# Patient Record
Sex: FEMALE | Race: WHITE | NOT HISPANIC OR LATINO | Employment: OTHER | URBAN - METROPOLITAN AREA
[De-identification: names, ages, dates, MRNs, and addresses within clinical notes are randomized per-mention and may not be internally consistent; named-entity substitution may affect disease eponyms.]

---

## 2017-01-01 ENCOUNTER — HOSPITAL ENCOUNTER (INPATIENT)
Facility: MEDICAL CENTER | Age: 82
LOS: 2 days | DRG: 061 | End: 2017-08-26
Attending: EMERGENCY MEDICINE | Admitting: INTERNAL MEDICINE
Payer: MEDICARE

## 2017-01-01 ENCOUNTER — APPOINTMENT (OUTPATIENT)
Dept: RADIOLOGY | Facility: MEDICAL CENTER | Age: 82
DRG: 061 | End: 2017-01-01
Attending: PSYCHIATRY & NEUROLOGY
Payer: MEDICARE

## 2017-01-01 ENCOUNTER — APPOINTMENT (OUTPATIENT)
Dept: RADIOLOGY | Facility: MEDICAL CENTER | Age: 82
DRG: 061 | End: 2017-01-01
Attending: EMERGENCY MEDICINE
Payer: MEDICARE

## 2017-01-01 ENCOUNTER — APPOINTMENT (OUTPATIENT)
Dept: RADIOLOGY | Facility: MEDICAL CENTER | Age: 82
DRG: 061 | End: 2017-01-01
Attending: INTERNAL MEDICINE
Payer: MEDICARE

## 2017-01-01 ENCOUNTER — APPOINTMENT (OUTPATIENT)
Dept: RADIOLOGY | Facility: MEDICAL CENTER | Age: 82
DRG: 061 | End: 2017-01-01
Attending: FAMILY MEDICINE
Payer: MEDICARE

## 2017-01-01 VITALS
TEMPERATURE: 98.9 F | HEIGHT: 66 IN | OXYGEN SATURATION: 90 % | SYSTOLIC BLOOD PRESSURE: 135 MMHG | DIASTOLIC BLOOD PRESSURE: 85 MMHG | BODY MASS INDEX: 29.8 KG/M2 | RESPIRATION RATE: 27 BRPM | HEART RATE: 109 BPM | WEIGHT: 185.41 LBS

## 2017-01-01 DIAGNOSIS — I63.20 CEREBROVASCULAR ACCIDENT (CVA) DUE TO OCCLUSION OF PRECEREBRAL ARTERY (HCC): ICD-10-CM

## 2017-01-01 LAB
25(OH)D3 SERPL-MCNC: 21 NG/ML (ref 30–100)
ABO GROUP BLD: NORMAL
ABO GROUP BLD: NORMAL
ALBUMIN SERPL BCP-MCNC: 3.2 G/DL (ref 3.2–4.9)
ALBUMIN SERPL BCP-MCNC: 3.4 G/DL (ref 3.2–4.9)
ALBUMIN SERPL BCP-MCNC: 3.7 G/DL (ref 3.2–4.9)
ALBUMIN/GLOB SERPL: 1.1 G/DL
ALBUMIN/GLOB SERPL: 1.1 G/DL
ALP SERPL-CCNC: 58 U/L (ref 30–99)
ALP SERPL-CCNC: 60 U/L (ref 30–99)
ALT SERPL-CCNC: 6 U/L (ref 2–50)
ALT SERPL-CCNC: 9 U/L (ref 2–50)
ANION GAP SERPL CALC-SCNC: 7 MMOL/L (ref 0–11.9)
ANION GAP SERPL CALC-SCNC: 8 MMOL/L (ref 0–11.9)
ANION GAP SERPL CALC-SCNC: 9 MMOL/L (ref 0–11.9)
APPEARANCE UR: ABNORMAL
APTT PPP: 28.4 SEC (ref 24.7–36)
AST SERPL-CCNC: 12 U/L (ref 12–45)
AST SERPL-CCNC: 15 U/L (ref 12–45)
BACTERIA #/AREA URNS HPF: ABNORMAL /HPF
BASOPHILS # BLD AUTO: 0.5 % (ref 0–1.8)
BASOPHILS # BLD AUTO: 0.6 % (ref 0–1.8)
BASOPHILS # BLD AUTO: 0.6 % (ref 0–1.8)
BASOPHILS # BLD: 0.04 K/UL (ref 0–0.12)
BASOPHILS # BLD: 0.04 K/UL (ref 0–0.12)
BASOPHILS # BLD: 0.05 K/UL (ref 0–0.12)
BILIRUB SERPL-MCNC: 0.5 MG/DL (ref 0.1–1.5)
BILIRUB SERPL-MCNC: 0.7 MG/DL (ref 0.1–1.5)
BILIRUB UR QL STRIP.AUTO: NEGATIVE
BLD GP AB SCN SERPL QL: NORMAL
BUN SERPL-MCNC: 23 MG/DL (ref 8–22)
BUN SERPL-MCNC: 31 MG/DL (ref 8–22)
BUN SERPL-MCNC: 41 MG/DL (ref 8–22)
CALCIUM SERPL-MCNC: 10.1 MG/DL (ref 8.5–10.5)
CALCIUM SERPL-MCNC: 6.6 MG/DL (ref 8.5–10.5)
CALCIUM SERPL-MCNC: 8.9 MG/DL (ref 8.5–10.5)
CHLORIDE SERPL-SCNC: 103 MMOL/L (ref 96–112)
CHLORIDE SERPL-SCNC: 109 MMOL/L (ref 96–112)
CHLORIDE SERPL-SCNC: 117 MMOL/L (ref 96–112)
CHOLEST SERPL-MCNC: 147 MG/DL (ref 100–199)
CO2 SERPL-SCNC: 17 MMOL/L (ref 20–33)
CO2 SERPL-SCNC: 23 MMOL/L (ref 20–33)
CO2 SERPL-SCNC: 26 MMOL/L (ref 20–33)
COLOR UR: YELLOW
CREAT SERPL-MCNC: 0.69 MG/DL (ref 0.5–1.4)
CREAT SERPL-MCNC: 0.88 MG/DL (ref 0.5–1.4)
CREAT SERPL-MCNC: 1.2 MG/DL (ref 0.5–1.4)
EOSINOPHIL # BLD AUTO: 0.13 K/UL (ref 0–0.51)
EOSINOPHIL # BLD AUTO: 0.2 K/UL (ref 0–0.51)
EOSINOPHIL # BLD AUTO: 0.24 K/UL (ref 0–0.51)
EOSINOPHIL NFR BLD: 1.8 % (ref 0–6.9)
EOSINOPHIL NFR BLD: 2.4 % (ref 0–6.9)
EOSINOPHIL NFR BLD: 2.7 % (ref 0–6.9)
EPI CELLS #/AREA URNS HPF: NEGATIVE /HPF
ERYTHROCYTE [DISTWIDTH] IN BLOOD BY AUTOMATED COUNT: 44.1 FL (ref 35.9–50)
ERYTHROCYTE [DISTWIDTH] IN BLOOD BY AUTOMATED COUNT: 44.3 FL (ref 35.9–50)
ERYTHROCYTE [DISTWIDTH] IN BLOOD BY AUTOMATED COUNT: 45.1 FL (ref 35.9–50)
EST. AVERAGE GLUCOSE BLD GHB EST-MCNC: 174 MG/DL
GFR SERPL CREATININE-BSD FRML MDRD: 42 ML/MIN/1.73 M 2
GFR SERPL CREATININE-BSD FRML MDRD: 60 ML/MIN/1.73 M 2
GFR SERPL CREATININE-BSD FRML MDRD: >60 ML/MIN/1.73 M 2
GLOBULIN SER CALC-MCNC: 2.9 G/DL (ref 1.9–3.5)
GLOBULIN SER CALC-MCNC: 3.5 G/DL (ref 1.9–3.5)
GLUCOSE BLD-MCNC: 135 MG/DL (ref 65–99)
GLUCOSE BLD-MCNC: 140 MG/DL (ref 65–99)
GLUCOSE BLD-MCNC: 147 MG/DL (ref 65–99)
GLUCOSE BLD-MCNC: 158 MG/DL (ref 65–99)
GLUCOSE BLD-MCNC: 158 MG/DL (ref 65–99)
GLUCOSE SERPL-MCNC: 131 MG/DL (ref 65–99)
GLUCOSE SERPL-MCNC: 155 MG/DL (ref 65–99)
GLUCOSE SERPL-MCNC: 167 MG/DL (ref 65–99)
GLUCOSE UR STRIP.AUTO-MCNC: NEGATIVE MG/DL
GRAM STN SPEC: NORMAL
HBA1C MFR BLD: 7.7 % (ref 0–5.6)
HCT VFR BLD AUTO: 30.8 % (ref 37–47)
HCT VFR BLD AUTO: 40.6 % (ref 37–47)
HCT VFR BLD AUTO: 42.9 % (ref 37–47)
HDLC SERPL-MCNC: 39 MG/DL
HGB BLD-MCNC: 13 G/DL (ref 12–16)
HGB BLD-MCNC: 13.9 G/DL (ref 12–16)
HGB BLD-MCNC: 9.8 G/DL (ref 12–16)
IMM GRANULOCYTES # BLD AUTO: 0.04 K/UL (ref 0–0.11)
IMM GRANULOCYTES # BLD AUTO: 0.05 K/UL (ref 0–0.11)
IMM GRANULOCYTES # BLD AUTO: 0.06 K/UL (ref 0–0.11)
IMM GRANULOCYTES NFR BLD AUTO: 0.5 % (ref 0–0.9)
IMM GRANULOCYTES NFR BLD AUTO: 0.6 % (ref 0–0.9)
IMM GRANULOCYTES NFR BLD AUTO: 0.8 % (ref 0–0.9)
INR PPP: 1.06 (ref 0.87–1.13)
KETONES UR STRIP.AUTO-MCNC: ABNORMAL MG/DL
LDLC SERPL CALC-MCNC: 79 MG/DL
LEUKOCYTE ESTERASE UR QL STRIP.AUTO: ABNORMAL
LV EJECT FRACT  99904: 75
LV EJECT FRACT MOD 2C 99903: 84.57
LV EJECT FRACT MOD 4C 99902: 87.52
LV EJECT FRACT MOD BP 99901: 86.61
LYMPHOCYTES # BLD AUTO: 1.22 K/UL (ref 1–4.8)
LYMPHOCYTES # BLD AUTO: 1.29 K/UL (ref 1–4.8)
LYMPHOCYTES # BLD AUTO: 2.51 K/UL (ref 1–4.8)
LYMPHOCYTES NFR BLD: 15.3 % (ref 22–41)
LYMPHOCYTES NFR BLD: 16.8 % (ref 22–41)
LYMPHOCYTES NFR BLD: 28.5 % (ref 22–41)
MAGNESIUM SERPL-MCNC: 1.5 MG/DL (ref 1.5–2.5)
MAGNESIUM SERPL-MCNC: 2.3 MG/DL (ref 1.5–2.5)
MCH RBC QN AUTO: 30.2 PG (ref 27–33)
MCHC RBC AUTO-ENTMCNC: 31.8 G/DL (ref 33.6–35)
MCHC RBC AUTO-ENTMCNC: 32.1 G/DL (ref 33.6–35)
MCHC RBC AUTO-ENTMCNC: 32.4 G/DL (ref 33.6–35)
MCV RBC AUTO: 93.3 FL (ref 81.4–97.8)
MCV RBC AUTO: 94.1 FL (ref 81.4–97.8)
MCV RBC AUTO: 95.1 FL (ref 81.4–97.8)
MICRO URNS: ABNORMAL
MONOCYTES # BLD AUTO: 0.76 K/UL (ref 0–0.85)
MONOCYTES # BLD AUTO: 0.82 K/UL (ref 0–0.85)
MONOCYTES # BLD AUTO: 0.84 K/UL (ref 0–0.85)
MONOCYTES NFR BLD AUTO: 10.5 % (ref 0–13.4)
MONOCYTES NFR BLD AUTO: 9.5 % (ref 0–13.4)
MONOCYTES NFR BLD AUTO: 9.7 % (ref 0–13.4)
NEUTROPHILS # BLD AUTO: 5.06 K/UL (ref 2–7.15)
NEUTROPHILS # BLD AUTO: 5.15 K/UL (ref 2–7.15)
NEUTROPHILS # BLD AUTO: 6.02 K/UL (ref 2–7.15)
NEUTROPHILS NFR BLD: 58.3 % (ref 44–72)
NEUTROPHILS NFR BLD: 69.5 % (ref 44–72)
NEUTROPHILS NFR BLD: 71.4 % (ref 44–72)
NITRITE UR QL STRIP.AUTO: NEGATIVE
NRBC # BLD AUTO: 0 K/UL
NRBC # BLD AUTO: 0 K/UL
NRBC # BLD AUTO: 0.04 K/UL
NRBC BLD AUTO-RTO: 0 /100 WBC
NRBC BLD AUTO-RTO: 0 /100 WBC
NRBC BLD AUTO-RTO: 0.6 /100 WBC
PH UR STRIP.AUTO: 5.5 [PH]
PHOSPHATE SERPL-MCNC: 2.7 MG/DL (ref 2.5–4.5)
PLATELET # BLD AUTO: 155 K/UL (ref 164–446)
PLATELET # BLD AUTO: 212 K/UL (ref 164–446)
PLATELET # BLD AUTO: 220 K/UL (ref 164–446)
PMV BLD AUTO: 10.3 FL (ref 9–12.9)
PMV BLD AUTO: 10.6 FL (ref 9–12.9)
PMV BLD AUTO: 11.4 FL (ref 9–12.9)
POTASSIUM SERPL-SCNC: 3.3 MMOL/L (ref 3.6–5.5)
POTASSIUM SERPL-SCNC: 4.6 MMOL/L (ref 3.6–5.5)
POTASSIUM SERPL-SCNC: 4.9 MMOL/L (ref 3.6–5.5)
PROT SERPL-MCNC: 6.1 G/DL (ref 6–8.2)
PROT SERPL-MCNC: 7.2 G/DL (ref 6–8.2)
PROT UR QL STRIP: 30 MG/DL
PROTHROMBIN TIME: 14.1 SEC (ref 12–14.6)
RBC # BLD AUTO: 3.24 M/UL (ref 4.2–5.4)
RBC # BLD AUTO: 4.27 M/UL (ref 4.2–5.4)
RBC # BLD AUTO: 4.6 M/UL (ref 4.2–5.4)
RBC # URNS HPF: >150 /HPF
RBC UR QL AUTO: ABNORMAL
RH BLD: NORMAL
SIGNIFICANT IND 70042: NORMAL
SITE SITE: NORMAL
SODIUM SERPL-SCNC: 137 MMOL/L (ref 135–145)
SODIUM SERPL-SCNC: 139 MMOL/L (ref 135–145)
SODIUM SERPL-SCNC: 143 MMOL/L (ref 135–145)
SOURCE SOURCE: NORMAL
SP GR UR STRIP.AUTO: 1.02
T4 FREE SERPL-MCNC: 1.24 NG/DL (ref 0.53–1.43)
TRIGL SERPL-MCNC: 144 MG/DL (ref 0–149)
TROPONIN I SERPL-MCNC: <0.01 NG/ML (ref 0–0.04)
TSH SERPL DL<=0.005 MIU/L-ACNC: 0.2 UIU/ML (ref 0.3–3.7)
UROBILINOGEN UR STRIP.AUTO-MCNC: 1 MG/DL
WBC # BLD AUTO: 7.3 K/UL (ref 4.8–10.8)
WBC # BLD AUTO: 8.4 K/UL (ref 4.8–10.8)
WBC # BLD AUTO: 8.8 K/UL (ref 4.8–10.8)
WBC #/AREA URNS HPF: ABNORMAL /HPF

## 2017-01-01 PROCEDURE — 93306 TTE W/DOPPLER COMPLETE: CPT | Mod: 26 | Performed by: INTERNAL MEDICINE

## 2017-01-01 PROCEDURE — G8996 SWALLOW CURRENT STATUS: HCPCS | Mod: CL

## 2017-01-01 PROCEDURE — 87070 CULTURE OTHR SPECIMN AEROBIC: CPT

## 2017-01-01 PROCEDURE — 700102 HCHG RX REV CODE 250 W/ 637 OVERRIDE(OP): Performed by: INTERNAL MEDICINE

## 2017-01-01 PROCEDURE — A6250 SKIN SEAL PROTECT MOISTURIZR: HCPCS | Performed by: INTERNAL MEDICINE

## 2017-01-01 PROCEDURE — 84443 ASSAY THYROID STIM HORMONE: CPT

## 2017-01-01 PROCEDURE — 70450 CT HEAD/BRAIN W/O DYE: CPT

## 2017-01-01 PROCEDURE — 84100 ASSAY OF PHOSPHORUS: CPT

## 2017-01-01 PROCEDURE — 83735 ASSAY OF MAGNESIUM: CPT

## 2017-01-01 PROCEDURE — 82962 GLUCOSE BLOOD TEST: CPT

## 2017-01-01 PROCEDURE — 700117 HCHG RX CONTRAST REV CODE 255: Performed by: EMERGENCY MEDICINE

## 2017-01-01 PROCEDURE — 36415 COLL VENOUS BLD VENIPUNCTURE: CPT

## 2017-01-01 PROCEDURE — 85025 COMPLETE CBC W/AUTO DIFF WBC: CPT

## 2017-01-01 PROCEDURE — 700111 HCHG RX REV CODE 636 W/ 250 OVERRIDE (IP): Performed by: INTERNAL MEDICINE

## 2017-01-01 PROCEDURE — G8997 SWALLOW GOAL STATUS: HCPCS | Mod: CJ

## 2017-01-01 PROCEDURE — 87205 SMEAR GRAM STAIN: CPT

## 2017-01-01 PROCEDURE — 700105 HCHG RX REV CODE 258: Performed by: EMERGENCY MEDICINE

## 2017-01-01 PROCEDURE — 37212 THROMBOLYTIC VENOUS THERAPY: CPT

## 2017-01-01 PROCEDURE — 700102 HCHG RX REV CODE 250 W/ 637 OVERRIDE(OP): Performed by: PSYCHIATRY & NEUROLOGY

## 2017-01-01 PROCEDURE — 700111 HCHG RX REV CODE 636 W/ 250 OVERRIDE (IP): Performed by: EMERGENCY MEDICINE

## 2017-01-01 PROCEDURE — 99291 CRITICAL CARE FIRST HOUR: CPT

## 2017-01-01 PROCEDURE — 82306 VITAMIN D 25 HYDROXY: CPT

## 2017-01-01 PROCEDURE — 94760 N-INVAS EAR/PLS OXIMETRY 1: CPT

## 2017-01-01 PROCEDURE — 71010 DX-CHEST-PORTABLE (1 VIEW): CPT

## 2017-01-01 PROCEDURE — 86901 BLOOD TYPING SEROLOGIC RH(D): CPT

## 2017-01-01 PROCEDURE — A4314 CATH W/DRAINAGE 2-WAY LATEX: HCPCS | Performed by: INTERNAL MEDICINE

## 2017-01-01 PROCEDURE — 82962 GLUCOSE BLOOD TEST: CPT | Mod: 91

## 2017-01-01 PROCEDURE — 93005 ELECTROCARDIOGRAM TRACING: CPT | Performed by: EMERGENCY MEDICINE

## 2017-01-01 PROCEDURE — 86900 BLOOD TYPING SEROLOGIC ABO: CPT

## 2017-01-01 PROCEDURE — 82040 ASSAY OF SERUM ALBUMIN: CPT

## 2017-01-01 PROCEDURE — 770022 HCHG ROOM/CARE - ICU (200)

## 2017-01-01 PROCEDURE — 80061 LIPID PANEL: CPT

## 2017-01-01 PROCEDURE — 92610 EVALUATE SWALLOWING FUNCTION: CPT

## 2017-01-01 PROCEDURE — 70498 CT ANGIOGRAPHY NECK: CPT

## 2017-01-01 PROCEDURE — 3E03317 INTRODUCTION OF OTHER THROMBOLYTIC INTO PERIPHERAL VEIN, PERCUTANEOUS APPROACH: ICD-10-PCS | Performed by: PSYCHIATRY & NEUROLOGY

## 2017-01-01 PROCEDURE — 31720 CLEARANCE OF AIRWAYS: CPT

## 2017-01-01 PROCEDURE — 83036 HEMOGLOBIN GLYCOSYLATED A1C: CPT

## 2017-01-01 PROCEDURE — 85610 PROTHROMBIN TIME: CPT

## 2017-01-01 PROCEDURE — 302255 BARRIER CREAM MOISTURE BAZA PROTECT (ZINC) 5OZ: Performed by: INTERNAL MEDICINE

## 2017-01-01 PROCEDURE — 84439 ASSAY OF FREE THYROXINE: CPT

## 2017-01-01 PROCEDURE — 700105 HCHG RX REV CODE 258: Performed by: INTERNAL MEDICINE

## 2017-01-01 PROCEDURE — 80048 BASIC METABOLIC PNL TOTAL CA: CPT

## 2017-01-01 PROCEDURE — 70496 CT ANGIOGRAPHY HEAD: CPT

## 2017-01-01 PROCEDURE — A9270 NON-COVERED ITEM OR SERVICE: HCPCS | Performed by: PSYCHIATRY & NEUROLOGY

## 2017-01-01 PROCEDURE — 86850 RBC ANTIBODY SCREEN: CPT

## 2017-01-01 PROCEDURE — 85730 THROMBOPLASTIN TIME PARTIAL: CPT

## 2017-01-01 PROCEDURE — 93306 TTE W/DOPPLER COMPLETE: CPT

## 2017-01-01 PROCEDURE — 81001 URINALYSIS AUTO W/SCOPE: CPT

## 2017-01-01 PROCEDURE — 700101 HCHG RX REV CODE 250: Performed by: INTERNAL MEDICINE

## 2017-01-01 PROCEDURE — 92526 ORAL FUNCTION THERAPY: CPT

## 2017-01-01 PROCEDURE — 80053 COMPREHEN METABOLIC PANEL: CPT

## 2017-01-01 PROCEDURE — 84484 ASSAY OF TROPONIN QUANT: CPT

## 2017-01-01 RX ORDER — POTASSIUM CHLORIDE 750 MG/1
10 TABLET, FILM COATED, EXTENDED RELEASE ORAL DAILY
COMMUNITY

## 2017-01-01 RX ORDER — SODIUM CHLORIDE 9 MG/ML
INJECTION, SOLUTION INTRAVENOUS
Status: DISCONTINUED | OUTPATIENT
Start: 2017-01-01 | End: 2017-01-01

## 2017-01-01 RX ORDER — LORAZEPAM 2 MG/ML
1 CONCENTRATE ORAL
Status: DISCONTINUED | OUTPATIENT
Start: 2017-01-01 | End: 2017-01-01 | Stop reason: HOSPADM

## 2017-01-01 RX ORDER — HYDRALAZINE HYDROCHLORIDE 20 MG/ML
20 INJECTION INTRAMUSCULAR; INTRAVENOUS EVERY 4 HOURS PRN
Status: DISCONTINUED | OUTPATIENT
Start: 2017-01-01 | End: 2017-01-01

## 2017-01-01 RX ORDER — HYDRALAZINE HYDROCHLORIDE 20 MG/ML
10 INJECTION INTRAMUSCULAR; INTRAVENOUS
Status: DISCONTINUED | OUTPATIENT
Start: 2017-01-01 | End: 2017-01-01

## 2017-01-01 RX ORDER — SODIUM CHLORIDE 9 MG/ML
INJECTION, SOLUTION INTRAVENOUS CONTINUOUS
Status: DISCONTINUED | OUTPATIENT
Start: 2017-01-01 | End: 2017-01-01

## 2017-01-01 RX ORDER — BISACODYL 10 MG
10 SUPPOSITORY, RECTAL RECTAL
Status: DISCONTINUED | OUTPATIENT
Start: 2017-01-01 | End: 2017-01-01

## 2017-01-01 RX ORDER — LABETALOL HYDROCHLORIDE 5 MG/ML
10 INJECTION, SOLUTION INTRAVENOUS
Status: DISCONTINUED | OUTPATIENT
Start: 2017-01-01 | End: 2017-01-01

## 2017-01-01 RX ORDER — SODIUM CHLORIDE 9 MG/ML
50 INJECTION, SOLUTION INTRAVENOUS ONCE
Status: COMPLETED | OUTPATIENT
Start: 2017-01-01 | End: 2017-01-01

## 2017-01-01 RX ORDER — ATROPINE SULFATE 10 MG/ML
2 SOLUTION/ DROPS OPHTHALMIC EVERY 4 HOURS PRN
Status: DISCONTINUED | OUTPATIENT
Start: 2017-01-01 | End: 2017-01-01 | Stop reason: HOSPADM

## 2017-01-01 RX ORDER — MORPHINE SULFATE 10 MG/ML
10 INJECTION, SOLUTION INTRAMUSCULAR; INTRAVENOUS
Status: DISCONTINUED | OUTPATIENT
Start: 2017-01-01 | End: 2017-01-01 | Stop reason: HOSPADM

## 2017-01-01 RX ORDER — AMOXICILLIN 250 MG
2 CAPSULE ORAL 2 TIMES DAILY
Status: DISCONTINUED | OUTPATIENT
Start: 2017-01-01 | End: 2017-01-01

## 2017-01-01 RX ORDER — LORAZEPAM 2 MG/ML
1 INJECTION INTRAMUSCULAR
Status: DISCONTINUED | OUTPATIENT
Start: 2017-01-01 | End: 2017-01-01 | Stop reason: HOSPADM

## 2017-01-01 RX ORDER — FUROSEMIDE 40 MG/1
80 TABLET ORAL DAILY
COMMUNITY

## 2017-01-01 RX ORDER — DEXTROSE MONOHYDRATE 25 G/50ML
25 INJECTION, SOLUTION INTRAVENOUS
Status: DISCONTINUED | OUTPATIENT
Start: 2017-01-01 | End: 2017-01-01

## 2017-01-01 RX ORDER — ATORVASTATIN CALCIUM 40 MG/1
40 TABLET, FILM COATED ORAL
Status: DISCONTINUED | OUTPATIENT
Start: 2017-01-01 | End: 2017-01-01

## 2017-01-01 RX ORDER — LORAZEPAM 2 MG/ML
2-4 INJECTION INTRAMUSCULAR
Status: DISCONTINUED | OUTPATIENT
Start: 2017-01-01 | End: 2017-01-01

## 2017-01-01 RX ORDER — LISINOPRIL 20 MG/1
20 TABLET ORAL
Status: DISCONTINUED | OUTPATIENT
Start: 2017-01-01 | End: 2017-01-01

## 2017-01-01 RX ORDER — MORPHINE SULFATE 100 MG/5ML
10 SOLUTION ORAL
Status: DISCONTINUED | OUTPATIENT
Start: 2017-01-01 | End: 2017-01-01 | Stop reason: HOSPADM

## 2017-01-01 RX ORDER — MAGNESIUM SULFATE HEPTAHYDRATE 40 MG/ML
2 INJECTION, SOLUTION INTRAVENOUS ONCE
Status: COMPLETED | OUTPATIENT
Start: 2017-01-01 | End: 2017-01-01

## 2017-01-01 RX ORDER — POLYVINYL ALCOHOL 14 MG/ML
2 SOLUTION/ DROPS OPHTHALMIC EVERY 6 HOURS PRN
Status: DISCONTINUED | OUTPATIENT
Start: 2017-01-01 | End: 2017-01-01 | Stop reason: HOSPADM

## 2017-01-01 RX ORDER — POTASSIUM CHLORIDE 20 MEQ/1
20 TABLET, EXTENDED RELEASE ORAL DAILY
Status: DISCONTINUED | OUTPATIENT
Start: 2017-01-01 | End: 2017-01-01

## 2017-01-01 RX ORDER — ONDANSETRON 4 MG/1
8 TABLET, ORALLY DISINTEGRATING ORAL EVERY 8 HOURS PRN
Status: DISCONTINUED | OUTPATIENT
Start: 2017-01-01 | End: 2017-01-01 | Stop reason: HOSPADM

## 2017-01-01 RX ORDER — ONDANSETRON 2 MG/ML
8 INJECTION INTRAMUSCULAR; INTRAVENOUS EVERY 8 HOURS PRN
Status: DISCONTINUED | OUTPATIENT
Start: 2017-01-01 | End: 2017-01-01 | Stop reason: HOSPADM

## 2017-01-01 RX ORDER — POLYETHYLENE GLYCOL 3350 17 G/17G
1 POWDER, FOR SOLUTION ORAL
Status: DISCONTINUED | OUTPATIENT
Start: 2017-01-01 | End: 2017-01-01

## 2017-01-01 RX ORDER — LABETALOL HYDROCHLORIDE 5 MG/ML
10 INJECTION, SOLUTION INTRAVENOUS EVERY 4 HOURS PRN
Status: DISCONTINUED | OUTPATIENT
Start: 2017-01-01 | End: 2017-01-01

## 2017-01-01 RX ORDER — SODIUM CHLORIDE AND POTASSIUM CHLORIDE 150; 900 MG/100ML; MG/100ML
INJECTION, SOLUTION INTRAVENOUS CONTINUOUS
Status: DISCONTINUED | OUTPATIENT
Start: 2017-01-01 | End: 2017-01-01

## 2017-01-01 RX ORDER — MORPHINE SULFATE 4 MG/ML
1-5 INJECTION, SOLUTION INTRAMUSCULAR; INTRAVENOUS
Status: DISCONTINUED | OUTPATIENT
Start: 2017-01-01 | End: 2017-01-01

## 2017-01-01 RX ORDER — SCOLOPAMINE TRANSDERMAL SYSTEM 1 MG/1
1 PATCH, EXTENDED RELEASE TRANSDERMAL
Status: DISCONTINUED | OUTPATIENT
Start: 2017-01-01 | End: 2017-01-01

## 2017-01-01 RX ORDER — ASPIRIN 300 MG/1
300 SUPPOSITORY RECTAL DAILY
Status: DISCONTINUED | OUTPATIENT
Start: 2017-01-01 | End: 2017-01-01

## 2017-01-01 RX ORDER — SCOLOPAMINE TRANSDERMAL SYSTEM 1 MG/1
1 PATCH, EXTENDED RELEASE TRANSDERMAL
Status: DISCONTINUED | OUTPATIENT
Start: 2017-01-01 | End: 2017-01-01 | Stop reason: HOSPADM

## 2017-01-01 RX ORDER — MORPHINE SULFATE 10 MG/ML
5 INJECTION, SOLUTION INTRAMUSCULAR; INTRAVENOUS
Status: DISCONTINUED | OUTPATIENT
Start: 2017-01-01 | End: 2017-01-01 | Stop reason: HOSPADM

## 2017-01-01 RX ADMIN — SODIUM CHLORIDE: 9 INJECTION, SOLUTION INTRAVENOUS at 01:10

## 2017-01-01 RX ADMIN — POTASSIUM CHLORIDE AND SODIUM CHLORIDE: 900; 150 INJECTION, SOLUTION INTRAVENOUS at 15:20

## 2017-01-01 RX ADMIN — MAGNESIUM SULFATE IN WATER 2 G: 40 INJECTION, SOLUTION INTRAVENOUS at 09:38

## 2017-01-01 RX ADMIN — ALTEPLASE 68 MG: KIT at 23:12

## 2017-01-01 RX ADMIN — MORPHINE SULFATE 4 MG: 4 INJECTION INTRAVENOUS at 12:10

## 2017-01-01 RX ADMIN — INSULIN LISPRO 2 UNITS: 100 INJECTION, SOLUTION INTRAVENOUS; SUBCUTANEOUS at 06:28

## 2017-01-01 RX ADMIN — INSULIN LISPRO 2 UNITS: 100 INJECTION, SOLUTION INTRAVENOUS; SUBCUTANEOUS at 06:33

## 2017-01-01 RX ADMIN — IOHEXOL 100 ML: 350 INJECTION, SOLUTION INTRAVENOUS at 22:28

## 2017-01-01 RX ADMIN — SODIUM CHLORIDE 50 ML: 9 INJECTION, SOLUTION INTRAVENOUS at 00:15

## 2017-01-01 RX ADMIN — ASPIRIN 300 MG: 300 SUPPOSITORY RECTAL at 11:06

## 2017-01-01 ASSESSMENT — ENCOUNTER SYMPTOMS
FALLS: 1
CHILLS: 0
COUGH: 0
ORTHOPNEA: 0
ABDOMINAL PAIN: 0
BLURRED VISION: 0
VOMITING: 0
FOCAL WEAKNESS: 1
FEVER: 0
NECK PAIN: 0
PALPITATIONS: 0
HEMOPTYSIS: 0
SHORTNESS OF BREATH: 0
NAUSEA: 0
HEADACHES: 0
DEPRESSION: 0
HEARTBURN: 0

## 2017-01-01 ASSESSMENT — PAIN SCALES - GENERAL
PAINLEVEL_OUTOF10: 0

## 2017-01-01 ASSESSMENT — LIFESTYLE VARIABLES
EVER_SMOKED: NEVER
ALCOHOL_USE: NO

## 2017-01-01 ASSESSMENT — PATIENT HEALTH QUESTIONNAIRE - PHQ9
1. LITTLE INTEREST OR PLEASURE IN DOING THINGS: NOT AT ALL
SUM OF ALL RESPONSES TO PHQ QUESTIONS 1-9: 0
SUM OF ALL RESPONSES TO PHQ9 QUESTIONS 1 AND 2: 0

## 2017-08-24 PROBLEM — I63.9 STROKE (HCC): Status: ACTIVE | Noted: 2017-01-01

## 2017-08-25 PROBLEM — F03.90 DEMENTIA (HCC): Chronic | Status: ACTIVE | Noted: 2017-01-01

## 2017-08-25 PROBLEM — R60.0 PEDAL EDEMA: Status: ACTIVE | Noted: 2017-01-01

## 2017-08-25 NOTE — ED NOTES
Pt to room 3 from CT. Report from [facility]ERS. Pt BIB [facility] for stroke symptoms, onset 1930 with slurred speech and left sided facial droop. Pt then took a nap x 1.5 hours, woke with left sided deficits.  at . Pt is oriented to self and place only. Denies headache. Right pupil slightly bigger than left, both reactive. Pt has left sided facial droop, / push pulls weaker on left, + drift left upper and left lower extremity. NIH score of 10. VSS.

## 2017-08-25 NOTE — H&P
DATE OF ADMISSION:  08/24/2017    CHIEF COMPLAINT:  Left-sided weakness and facial droop.    HISTORY OF PRESENT ILLNESS:  The patient is a 89-year-old fairly independent   female with past medical history significant for mild dementia, systemic   arterial hypertension, hypertrophic cardiomyopathy and thyroid disease, who   had a sudden onset of slurred speech and left facial droop at approximately   1930.  She laid down and took an hour and a half nap and when she awoke, she   had left arm and leg weakness which caused her to stumble and fall striking   her head but without loss of consciousness.  She was brought in by EMS and was   found to have an NIH score of 10.  A CT head without contrast revealed no   acute intracranial process and a CT angio of the brain revealed occlusion of   the right middle cerebral artery as well as the right internal carotid artery.    She remained within the thrombolytic window with no absolute   contraindications and in consultation with tele-neurology, Dr. Mejía she was   given TPA.  At the time of my evaluation, she is regaining slight strength in   her left upper and left lower extremity but continues to have the facial droop   and slurred speech.  She was never hypertensive despite stroke.  Per her son,   patient lives with his nephew and uses a walker at baseline, but is fairly   independent.  She was admitted back in January of 2016 for urinary tract   infection and spent 3 weeks at rehab at that time, but was driving up until   that point.  Her biggest fear however, is becoming bedbound, which she would   never want per her son.  She has not had any recent illness symptoms, chest   pain, syncope, palpitations that her son is aware of.  Patient is unable to   provide additional history at this time given her slurred speech and slight   degree of confusion.    PAST MEDICAL HISTORY:  1.  Systemic arterial hypertension.  2.  Hypertrophic cardiomyopathy with mitral  regurgitation.  3.  Hypothyroidism.  4.  Diabetes.  5.  Urinary tract infections.  6.  Hypoxemia using occasional oxygen at home.    ALLERGIES:  TO PENICILLIN.    OUTPATIENT MEDICATIONS:  Include potassium chloride, Lasix, metformin,   glipizide, Synthroid and lisinopril.    PAST SURGICAL HISTORY:  Includes abdominal hysterectomy, tonsillectomy, and   mastectomy.    SOCIAL HISTORY:  Negative for tobacco, alcohol and drugs.  She lives with her   nephew who helps care for her and is a DNR for her code status.    FAMILY HISTORY:  Noncontributory.    REVIEW OF SYSTEMS:  Please see history present illness, otherwise unable to   obtain additional given patient's current clinical condition.    PHYSICAL EXAMINATION:  VITAL SIGNS:  Temperature 36.8 degrees Celsius, heart rate of 71, respiratory   rate of 17, oxygen saturation 97% on 2 liters nasal cannula and a blood   pressure of 142/45.  GENERAL:  Well-developed, well-nourished, female, resting comfortably in no   acute distress.  HEENT:  Normocephalic, atraumatic.  Pupils are equal, round and reactive to   light without scleral icterus or conjunctival pallor.  No nystagmus noted.    She does have left facial droop, but no drooling and positive slurred speech.  NECK:  Supple.  Trachea is midline.  There is no stridor nor jugular venous   distention.  No obvious carotid bruit.  CARDIOVASCULAR:  Regular rate and rhythm with a 4/6 systolic murmur in the   left sternal border, laterally displaced PMI.  Radial pulses are 2+ and   symmetric, brisk capillary refill.  PULMONARY:  Clear to auscultation bilaterally without wheezing, rhonchi, or   rales.  Breathing comfortably and able to speak without respiratory   difficulty.  ABDOMEN:  Soft, nontender and nondistended.  Positive bowel sounds.  GENITOURINARY:  Normal external female genitalia with Flor catheter in place.  EXTREMITIES:  Trace lower extremity edema with mild chronic venous stasis   changes of the skin.  No clubbing  nor cyanosis.  NEUROLOGIC:  Patient is awake, oriented to person and place, but having   difficulty with memory of the event.  She has left facial droop, slurred   speech, a pronator drift on the left side and 4/5 strength in left upper and   3/5 strength in the left lower extremity, normal strength in the right side.    Unable to follow commands briskly on that side.  SKIN:  Warm, pink and dry without lesions or rash.    LABORATORY DATA:  CBC with a white count of 9000, hemoglobin 14, platelets of   220 with a normal differential.  Complete metabolic panel remarkable for a BUN   of 41, creatinine 1.20.  Glucose of 167.  LFTs within normal limits.  Calcium   is 10.1, troponin less than 0.01.  Coags within normal limits.    IMAGIN.  CT head without contrast showing area of low density in the right parietal   occipital lobe compatible with areas of infarct with changes of small vessel   ischemic disease and mild atrophy and atherosclerosis.  2.  CT angio of the head with and without processing shows an occlusion of the   right middle cerebral artery at distal M1, occlusion of the right internal   carotid artery extending from just distal to the bifurcation to the   intracranial internal carotid arterial segments with reconstitution of the   right circulation via [place], a right P1 segment narrowing   compatible with stenosis of partial occlusion and distal right vertebral   artery stenosis of approximately 70% luminal narrowing.  3.  Chest x-ray showing fine reticular pattern, worse at the bases consistent   with mild pulmonary edema, enlarged cardiac silhouette.  No consolidation or   effusions noted nor tubes nor lines.    EKG showing normal sinus rhythm with a rate of 88 with left axis deviation, AV   dissociations and no acute ST changes.    ASSESSMENT:  1.  Acute ischemic stroke with occlusion of the right MCA and right ICA status   post TPA and not candidate for interventional thrombectomy.  2.   Systemic arterial hypertension.  3.  Diabetes with hyperglycemia.  4.  Acute kidney injury.  5.  Mild noncardiogenic pulmonary edema, query neurogenic in nature.  6.  Mild dementia at baseline.  7.  Hypothyroidism.  8.  Do-not-resuscitate/do-not-intubate.    PLAN:  The patient is critically ill at this time post-thrombolysis and will   be admitted to the intensive care unit for the next 24 hours.  She will   undergo post-thrombolysis protocol including serial neurologic exams   monitoring for any signs of bleeding and neurologic decline.  She will be   started on antiplatelet agents post-thrombolysis as well as high dose statin.    She will undergo MRI of her brain as well as echocardiogram and will follow   up on her CT imaging of her neck vessels and if that was not done, then a   carotid duplex.  She will need aggressive therapy evaluation including speech   therapy and will keep her n.p.o. in the meantime until she has been seen by   them and cleared for diet.  Should she fail that she will likely need CORTRAK   placement for initiation of her statin and blood pressure control medications.    We will monitor her glucose closely using a sliding scale of insulin if   needed and will avoid venopuncture and procedures in the next 24 hours   post-TPA.  She will be kept on DVT prophylaxis per ICU protocols and her blood   pressure will be controlled with a goal systolic less than 185 and diastolic   less than 110.  Electrolytes will be monitored closely and replaced as   necessary.  The patient is critically ill at this time and we appreciate the   opportunity to assist in her care and will continue to follow along closely   with you.    Critical care time 32 minutes.  No time overlap.  Procedures are not included   in this time.    [id number].       ____________________________________     Jeremy Gonda, MD JG / OZZY    DD:  08/25/2017 00:19:43  DT:  08/25/2017 00:59:56    D#:  [id number]  Job#:  [id number]

## 2017-08-25 NOTE — THERAPY
Occupational Therapy Contact Note    OT order received. Pt on bedrest s/p alteplase infusion until tomorrrow AM. Will attempt when appropriate.    Huma Davenport, OTR/L

## 2017-08-25 NOTE — H&P
Internal Medicine Admitting History and Physical    Name Juhi Zhao     1923   Age/Sex 89 y.o. female   MRN [id number]   Code Status DNAR     After 5PM or if no immediate response to page, please call for cross-coverage  Attending/Team: Dr. Gonda/WILLY hill See Patient List for primary contact information  Call [phone] to page    Resident: Lydia        Chief Complaint:  Left sided weakness    HPI:  Ms. Zhao is a very pleasant 89 year old female with PMHx significant for DM type 2, hypertension, breast cancer status post surgery and radiation, hypothyroidism, and chronic venous stasis presents to the ER with complaints of left sided weakness.    At 7:30PM the patient was noted to have some facial droop and dysarthria with no other symptoms. She took a nap for one and a half hours and after waking up she was walking to the bathroom with assistance from her grandson, she fell down. Initially family thought patient was having high blood sugar and EMS were called. They arrived and assessed her to have a stroke with left sided arm and leg weakness.     She was brought in to the ER and stroke protocol was started. CT angio was done and she was found to have significant clot burden on the right side. She was administered tPA appropriately after consultation with tele-neurology by  ER physician. It was administered at 10:49 PM which is within the window period since possible onset of stroke.     Of note, code status was discussed with both patient and her son. It was made clear that patient preferred to have quality of life and wished to be independent. Her previous code status was partial DNR and after discussion with patient and family, code status was changed to DNAR.      Review of Systems   Constitutional: Negative for fever and chills.   Eyes: Negative for blurred vision.   Respiratory: Negative for cough and shortness of breath.    Cardiovascular: Positive for leg swelling. Negative for  "chest pain and palpitations.   Gastrointestinal: Negative for nausea, vomiting and abdominal pain.   Genitourinary: Negative for dysuria and urgency.   Musculoskeletal: Positive for falls. Negative for joint pain.   Skin: Negative for rash.   Neurological: Positive for focal weakness. Negative for headaches.   Psychiatric/Behavioral: Negative for depression.             Past Medical History:   Past Medical History   Diagnosis Date   • Arthritis    • Cancer (CMS-HCC)      breast lt   • Unspecified urinary incontinence    • Hypertrophic cardiomyopathy (CMS-HCC)    • MR (mitral regurgitation)    • Unspecified disorder of thyroid    • UTI (lower urinary tract infection) 1/11/2016   • Anesthesia      \"Fear of not waking up\"   • Diabetes (CMS-HCC)    • Hypertension    • Urinary bladder disorder      hx of UTI    • Dental disorder      upper dentures   • Bronchitis    • Breath shortness      uses oxygen as needed       Past Surgical History:  Past Surgical History   Procedure Laterality Date   • Abdominal hysterectomy total     • Tonsillectomy     • Node biopsy sentinel  7/1/2010     Performed by FAITH WARNER at [facility]   • Mastectomy  7/1/2010     Performed by FAITH WARNER at [facility]   • Lymph node excision  7/1/2010     Performed by FAITH WARNER at [facility]   • Ectropian repair Bilateral 8/31/2016     Procedure: ECTROPIAN REPAIR - LOWER LID W/TRANSCONJUNCTIVAL RETRACTORS REINSERTION ;  Surgeon: Gavin Zarco M.D.;  Location: [facility];  Service:    • Lid tightening Bilateral 8/31/2016     Procedure: LID TIGHTENING - LOWER LID;  Surgeon: Gavin Zarco M.D.;  Location: [facility];  Service:        Current Outpatient Medications:  Home Medications     Reviewed by Amber Heaton, PHARMD (Pharmacist) on 08/24/17 at 2210  Med List Status: Not Addressed    Medication Last Dose Status    furosemide (LASIX) 40 MG Tab  Active    glipiZIDE " "(GLUCOTROL) 5 MG Tab  Active    levothyroxine (SYNTHROID) 125 MCG Tab  Active    lisinopril (PRINIVIL, ZESTRIL) 30 MG tablet  Active    metformin (GLUCOPHAGE) 500 MG Tab  Active    potassium chloride ER (KLOR-CON 10) 10 MEQ tablet  Active                Medication Allergy/Sensitivities:  Allergies   Allergen Reactions   • Penicillins Rash         Family History:  Family History   Problem Relation Age of Onset   • Diabetes Mother    • Cancer Neg Hx        Social History:  Social History     Social History   • Marital Status:      Spouse Name: N/A   • Number of Children: N/A   • Years of Education: N/A     Occupational History   • Not on file.     Social History Main Topics   • Smoking status: Never Smoker    • Smokeless tobacco: Not on file   • Alcohol Use: No   • Drug Use: No   • Sexual Activity: Not on file     Other Topics Concern   • Not on file     Social History Narrative     Living situation: Lives with grandson  PCP : Jovani Tracy M.D.      Physical Exam     Filed Vitals:    08/24/17 2301 08/24/17 2317 08/24/17 2330 08/24/17 2346   BP:       Pulse: 90 84 83 71   Temp:       Resp: 20 21 20 17   Height:       Weight:       SpO2: 96% 97% 95% 97%     Body mass index is 29.87 kg/(m^2).  /85 mmHg  Pulse 71  Temp(Src) 36.8 °C (98.2 °F)  Resp 17  Ht 1.676 m (5' 6\")  Wt 83.915 kg (185 lb)  BMI 29.87 kg/m2  SpO2 97%  O2 therapy: Pulse Oximetry: 97 %, O2 (LPM): 2, O2 Delivery: Nasal Cannula    Physical Exam   Constitutional: She is oriented to person, place, and time and well-developed, well-nourished, and in no distress.   HENT:   Head: Normocephalic and atraumatic.   Cardiovascular: Normal rate, regular rhythm and normal heart sounds.    Pulmonary/Chest: Effort normal and breath sounds normal. She has no wheezes.   Abdominal: Soft. Bowel sounds are normal. There is no tenderness.   Neurological: She is alert and oriented to person, place, and time. She displays weakness (left sided weakness " (3/5)) and abnormal speech. A cranial nerve deficit (facial droop to right side) is present. She shows pronator drift. GCS score is 15.             Data Review       Old Records Request:   Completed  Current Records review and summary: Completed    Lab Data Review:  Recent Results (from the past 24 hour(s))   CBC WITH DIFFERENTIAL    Collection Time: 08/24/17  9:40 PM   Result Value Ref Range    WBC 8.8 4.8 - 10.8 K/uL    RBC 4.60 4.20 - 5.40 M/uL    Hemoglobin 13.9 12.0 - 16.0 g/dL    Hematocrit 42.9 37.0 - 47.0 %    MCV 93.3 81.4 - 97.8 fL    MCH 30.2 27.0 - 33.0 pg    MCHC 32.4 (L) 33.6 - 35.0 g/dL    RDW 44.1 35.9 - 50.0 fL    Platelet Count 220 164 - 446 K/uL    MPV 11.4 9.0 - 12.9 fL    Neutrophils-Polys 58.30 44.00 - 72.00 %    Lymphocytes 28.50 22.00 - 41.00 %    Monocytes 9.50 0.00 - 13.40 %    Eosinophils 2.70 0.00 - 6.90 %    Basophils 0.50 0.00 - 1.80 %    Immature Granulocytes 0.50 0.00 - 0.90 %    Nucleated RBC 0.00 /100 WBC    Neutrophils (Absolute) 5.15 2.00 - 7.15 K/uL    Lymphs (Absolute) 2.51 1.00 - 4.80 K/uL    Monos (Absolute) 0.84 0.00 - 0.85 K/uL    Eos (Absolute) 0.24 0.00 - 0.51 K/uL    Baso (Absolute) 0.04 0.00 - 0.12 K/uL    Immature Granulocytes (abs) 0.04 0.00 - 0.11 K/uL    NRBC (Absolute) 0.00 K/uL   COMP METABOLIC PANEL    Collection Time: 08/24/17  9:40 PM   Result Value Ref Range    Sodium 137 135 - 145 mmol/L    Potassium 4.9 3.6 - 5.5 mmol/L    Chloride 103 96 - 112 mmol/L    Co2 26 20 - 33 mmol/L    Anion Gap 8.0 0.0 - 11.9    Glucose 167 (H) 65 - 99 mg/dL    Bun 41 (H) 8 - 22 mg/dL    Creatinine 1.20 0.50 - 1.40 mg/dL    Calcium 10.1 8.5 - 10.5 mg/dL    AST(SGOT) 12 12 - 45 U/L    ALT(SGPT) 9 2 - 50 U/L    Alkaline Phosphatase 60 30 - 99 U/L    Total Bilirubin 0.5 0.1 - 1.5 mg/dL    Albumin 3.7 3.2 - 4.9 g/dL    Total Protein 7.2 6.0 - 8.2 g/dL    Globulin 3.5 1.9 - 3.5 g/dL    A-G Ratio 1.1 g/dL   PROTHROMBIN TIME    Collection Time: 08/24/17  9:40 PM   Result Value Ref Range     PT 14.1 12.0 - 14.6 sec    INR 1.06 0.87 - 1.13   APTT    Collection Time: 17  9:40 PM   Result Value Ref Range    APTT 28.4 24.7 - 36.0 sec   COD (ADULT)    Collection Time: 17  9:40 PM   Result Value Ref Range    ABO Grouping Only O     Rh Grouping Only POS     Antibody Screen-Cod NEG    TROPONIN    Collection Time: 17  9:40 PM   Result Value Ref Range    Troponin I <0.01 0.00 - 0.04 ng/mL   ESTIMATED GFR    Collection Time: 17  9:40 PM   Result Value Ref Range    GFR If  51 (A) >60 mL/min/1.73 m 2    GFR If Non  42 (A) >60 mL/min/1.73 m 2   EKG (NOW)    Collection Time: 17 10:34 PM   Result Value Ref Range    Report       [facility] Emergency Dept.    Test Date:  2017  Pt Name:    HERB HERR               Department: ER  MRN:        [id number]                      Room:       [facility]  Gender:     F                            Technician: [id number]  :        [id number]                   Requested By:DEIDRA DUARTE  Order #:    [id number]                    Reading MD:    Measurements  Intervals                                Axis  Rate:       88                           P:          92  MN:                                      QRS:        -32  QRSD:       92                           T:          39  QT:         372  QTc:        450    Interpretive Statements  AV DISSOCIATION  LEFT AXIS DEVIATION  Compared to ECG 2016 12:39:22  AV dissociation now present  Sinus rhythm no longer present     ABO CONFIRMATION    Collection Time: 17 10:56 PM   Result Value Ref Range    Second ABO Typing With Cod O        Imaging/Procedures Review:    ndependant Imaging Review: Completed  CT-CTA NECK WITH & W/O-POST PROCESSING   Final Result         1.  Occlusion of the right internal carotid artery just distal to the bifurcation.   2.  90% stenosis of the right internal carotid artery at the bifurcation.   3.  High-grade stenosis of  the left proximal internal carotid artery resulting in up to 86%.   4.  Atherosclerosis and soft plaque at the origin and proximal portion of the left subclavian artery resulting in up to 70% stenosis.   5.  Distal right vertebral artery stenosis up to approximately 55%.   6.  Scattered emphysema      These findings were discussed with the patient's clinician, Jimmy Lopes, on 8/24/2017 11:06 PM.      DX-CHEST-PORTABLE (1 VIEW)   Final Result         1.  Hazy bilateral pulmonary opacities, greatest in the lung bases, appearance suggests infiltrates.   2.  Atherosclerosis      CT-CTA HEAD WITH & W/O-POST PROCESS   Final Result         1.  Occlusion of the right middle cerebral artery at the distal M1.   2.  Occlusion of the right internal carotid artery extending from just distal to the bifurcation to the intracranial internal carotid arterial segments. Reconstitution of the right circulation is seen via the [place].   3.  Right P1 segment narrowing, compatible with stenosis or partial occlusion.   4.  Distal right vertebral artery stenosis with approximately 70% luminal narrowing.      These findings were discussed with the patient's clinician, Jimmy Lopes, on 8/24/2017 11:06 PM.      CT-HEAD W/O   Final Result         1.  Area of low-density in the right parieto-occipital lobe region, compatible with areas of infarct. New since prior study   2.  Changes of small vessel ischemic disease with mild atrophy noted.   3.  Atherosclerosis.      Echocardiogram Comp W/O cont    (Results Pending)   Cartoid Duplex ([facility] Only) - Do not order if CTA - Neck done in ER    (Results Pending)   MR-BRAIN-W/O    (Results Pending)            EKG:   EKG Independant Review: Completed  QTc:450, HR: 88, Normal Sinus Rhythm, no ST/T changes     Records reviewed and summarized in current documentation             Assessment/Plan     * Stroke (CMS-[facility]) (present on admission)  Assessment & Plan  Acute  ischemic embolic stroke causing left sided focal deficits.  CT-angio reveals occlusion of right MCA artery.  CTA of neck reveals high grade stenosis of both right and left internal carotid artery which is likely the source of the embolism.  Status post tPA in ER after consultation with tele-neurology. (NIH score was 10).  Will monitor in ICU for 24 hours.  NPO for now, pending speech eval.  Aspirin may be started 24 hours after tPA. Atorvastatin 80mg daily may be started if patient is passes swallow eval otherwise coretrak tube has to be considered.  Avoid needle sticks for 24 hours unless there is loss of PIV line.  Consider vascular surgery consult in AM.  Echo pending.  PT/OT eval pending.  Target blood pressure is 185/110 or lower.    Type 2 diabetes mellitus (CMS-HCC) (present on admission)  Assessment & Plan  Recently diagnosed last year.  Will hold metformin and glypizide.  Monitor with accuchecks and use sliding scale insulin if necessary.    HTN (hypertension) (present on admission)  Assessment & Plan  Will hold home medication (Lisinopril 30mg daily) for now and they can be resumed after 24 hours.  Target blood pressure is 185/110 mmHg or lower.  Labetalol and hydralazine PRN in place.  Nicardipine drip to be started if blood pressure is not controlled with above PRN medication.    Hypothyroidism (present on admission)  Assessment & Plan  Hold levothyroxine 125mcg for now.  Can resume once patient is able to take diet.    Hypertrophic cardiomyopathy (CMS-HCC) (present on admission)  Assessment & Plan  No imaging on file.  Unclear history and etiology.  Echo pending.    Pedal edema (present on admission)  Assessment & Plan  Appears to be due to chronic venous stasis.  Was on lasix 80mg daily with KCL supplementation.  Will hold for now.        Anticipated Hospital stay:  >2 midnights        Quality Measures  EKG reviewed, Labs reviewed, Medications reviewed and Radiology images reviewed  Flor catheter: No  Flor      DVT Prophylaxis: Contraindicated - High bleeding risk  DVT prophylaxis - mechanical: SCDs  Ulcer prophylaxis: Yes

## 2017-08-25 NOTE — ASSESSMENT & PLAN NOTE
Acute ischemic embolic stroke causing left sided focal deficits.  CT-angio reveals occlusion of right MCA artery.  CTA of neck reveals high grade stenosis of both right and left internal carotid artery which is likely the source of the embolism.  Received tPA at 23 8/24/17 (NIH score was 10).  Swallow eval failed today  Place cortrak for feeding  Continue Aspirin and atorvastatin   Keep BP less than 185/110   Echo: EF 75%, no  valvular lesions  PT/OT eval   Discussed with family the vascular surgery vs meds and she is not a good candidate for surgery according to her age and dementia and co-morbidities.

## 2017-08-25 NOTE — ASSESSMENT & PLAN NOTE
FAST stage 5   She needs help for all ADLs  Could be vascular or alzheimer   PT/OT  Palliative care connsulted.

## 2017-08-25 NOTE — CONSULTS
"Reason for PC Consult: Advance Care Planning    Consulted by: Dr. Plata    Assessment:  General: 89 year old female admitted for acute ischemic embolic stroke on 8/24/17. Received TPA. PMH significant for dementia, breast CA s/p chemo and radiation, DM2, HTN, and hypothyroidism. Patient able to mumble incomprehensible words. Grandson/caregiver John Zhao at bedside.     Dyspnea: 97% on 4 LPM via nasal cannula.   Last BM:  PTA.    Pain: Rosanna reports pain \"when they turn her.\"   Depression: Unable to determine.      Spiritual:  Is Jainism or spirituality important for coping with this illness? No; rosanna declined spiritual care visit.   Has a  or spiritual provider visit been requested? No    Palliative Performance Scale: 10%    Advance Directive: None on file. Rosanna Zhao reports that the patient has one at home and he will bring a copy in tomorrow.     DPOA: None on file.    POLST: None on file.     Code Status: DNR.      Outcome:  Introduced myself and discussed role of palliative care. John moved from [place] to become his grandmother's full time caregiver. Discussed goals of care. He reports that patient failed her first swallow evaluation and they are awaiting a second. He believes the patient indicated she does not want a feeding tube per her AD. John expressed he can bring a copy in tomorrow. Patient's son Bari works for the school district and will be in later tonight. Offered to set up a meeting to discuss goals of care once second swallow evaluation is completed. John offered to reach out to Bari for availability. Provided palliative care brochure and business card and encouraged John to call with available time to have palliative care meet with him and Bari tomorrow.     Plan: John to bring AD tomorrow and reach out to Bari for availability to meet with palliative care. Will await notification from John to coordinate meeting time to discuss goals of " care.    Thank you for allowing Palliative Care to participate in this patient's care. Please feel free to call [phone] with any questions or concerns.

## 2017-08-25 NOTE — THERAPY
"Speech Language Therapy Clinical Swallow Evaluation completed.  Functional Status: Clinical swallow evaluation completed on this date.  Patient AA&O x3, not oriented to date but confused.  She inconsistently followed directives to complete the oral [facility] exam which revealed left-sided asymmetry, moderate diffuse weakness of the oral structures, reduced lingual and labial ROM and coordination.  Presentation of PO included ice chips x4, 1/2 tsps of nectars x4 and 1/4 tsp of purees x1.  The patient presented with s/sx of aspiration as seen by absent swallow trigger with >75% of all PO trials requiring oral suctioning via [place] and weak cough with ~40% of trials.  She had weak and sluggish laryngeal elevation when she did complete the swallow which was severely delayed.  At this time, the patient is not at the level for a PO diet.  Discussed case with RN and Dietitian.  Recommend the patient remain NPO today and reassess in the AM.  SLP following   Recommendations - Diet: Diet / Liquid Recommendation: NPO, Pre-Feeding Trials with SLP Only                          Strategies: To be assessed                          Medication Administration: Medication Administration :  (via IV)  Plan of Care: Will benefit from Speech Therapy 5 times per week  Post-Acute Therapy: Discharge to a transitional care facility for continued skilled therapy services.    See \"Rehab Therapy-Acute\" Patient Summary Report for complete documentation.   "

## 2017-08-25 NOTE — ASSESSMENT & PLAN NOTE
Appears to be due to chronic venous stasis with redness on skin(no infection).  Resumed lasix  Compression socks.

## 2017-08-25 NOTE — ASSESSMENT & PLAN NOTE
Recently diagnosed last year.  A1c:7.7  Home meds metformin and glypizide.  Hold home meds and continue SSI and hypoglycemic protocol.

## 2017-08-25 NOTE — ED PROVIDER NOTES
ED Provider Note    Scribed for Jimmy Lopes M.D. by Jimmy Lopes. 8/24/2017,  10:16 PM.    CHIEF COMPLAINT  Chief Complaint   Patient presents with   • Possible Stroke       HPI  Juhi Zhao is a 89 y.o. female who presents to the Emergency Department for a possible stroke, onset 7:30 PM, according to family. EMS reports that her family member who lives with her noted some slight slurring of her speech and some left-sided facial droop at 7:30. The patient then took a 1.5 hour nap. When she woke up, she was noted to be stumbling. She had a fall against a wall, striking her head, but did not lose consciousness. She is on aspirin but no other blood thinners. I met her immediately on arrival in the emergency department. She had obvious left-sided facial droop, slight slurring of her speech, and near flaccidity of her left leg and left upper extremity. She seemed to be more or less fully alert and oriented, though her exact baseline is unclear since she is not accompanied by family.     REVIEW OF SYSTEMS  See HPI for further details. All other systems are negative.     PAST MEDICAL HISTORY   has a past medical history of Arthritis; Cancer (CMS-HCC); Unspecified urinary incontinence; Hypertrophic cardiomyopathy (CMS-HCC); MR (mitral regurgitation); Unspecified disorder of thyroid; UTI (lower urinary tract infection) (1/11/2016); Anesthesia; Diabetes (CMS-[facility]); Hypertension; Urinary bladder disorder; Dental disorder; Bronchitis; and Breath shortness.    SOCIAL HISTORY  Social History     Social History Main Topics   • Smoking status: Never Smoker    • Smokeless tobacco: Not on file   • Alcohol Use: No   • Drug Use: No   • Sexual Activity: Not on file     History   Drug Use No       SURGICAL HISTORY   has past surgical history that includes abdominal hysterectomy total; tonsillectomy; node biopsy sentinel (7/1/2010); mastectomy (7/1/2010); lymph node excision (7/1/2010); ectropian repair (Bilateral,  "8/31/2016); and lid tightening (Bilateral, 8/31/2016).    CURRENT MEDICATIONS  Home Medications     Reviewed by JOO GrahamD (Pharmacist) on 08/24/17 at 2210  Med List Status: Not Addressed    Medication Last Dose Status    furosemide (LASIX) 40 MG Tab  Active    glipiZIDE (GLUCOTROL) 5 MG Tab  Active    levothyroxine (SYNTHROID) 125 MCG Tab  Active    lisinopril (PRINIVIL, ZESTRIL) 30 MG tablet  Active    metformin (GLUCOPHAGE) 500 MG Tab  Active    potassium chloride ER (KLOR-CON 10) 10 MEQ tablet  Active                ALLERGIES  Allergies   Allergen Reactions   • Penicillins Rash       PHYSICAL EXAM  VITAL SIGNS: /85 mmHg  Pulse 83  Temp(Src) 36.8 °C (98.2 °F)  Resp 20  Ht 1.676 m (5' 6\")  Wt 83.915 kg (185 lb)  BMI 29.87 kg/m2  SpO2 95%  Pulse ox interpretation: I interpret this pulse ox as normal.  Constitutional: Alert in no apparent distress.  HENT: No signs of trauma, Bilateral external ears normal, Nose normal.   Eyes: Conjunctiva normal, Non-icteric.   Neck: Normal range of motion, Supple, No stridor.   Lymphatic: No lymphadenopathy noted.   Cardiovascular: Regular rate and rhythm, no murmurs.   Thorax & Lungs: Normal breath sounds, No respiratory distress, No wheezing, No chest tenderness.   Abdomen: Bowel sounds normal, Soft, No tenderness, No masses, No pulsatile masses. No peritoneal signs.  Skin: Warm, Dry, No erythema, No rash.   Extremities: Intact distal pulses, No edema, No cyanosis.  Musculoskeletal: Good range of motion in all major joints. No or major deformities noted.   Neurologic: Alert , left-sided facial droop, near vicinity of the left arm and left leg. NIH of 10 for L sided Arm and leg and facial weakness, dysarthria, dysphasia, abnormal heel shin on one side, incorrect orientation questions.  Psychiatric: Affect normal, Judgment normal, Mood normal.     DIAGNOSTIC STUDIES / PROCEDURES    EKG  Interpreted by me    Rhythm:  Normal sinus rhythm   Rate: 88  Axis: " left  Intervals: normal  Ectopy: none  Conduction: normal  ST Segments: no acute change  T Waves: no acute change  Q Waves: none      LABS  Labs Reviewed   CBC WITH DIFFERENTIAL - Abnormal; Notable for the following:     MCHC 32.4 (*)     All other components within normal limits    Narrative:     Indicate which anticoagulants the patient is on:->UNKNOWN   COMP METABOLIC PANEL - Abnormal; Notable for the following:     Glucose 167 (*)     Bun 41 (*)     All other components within normal limits    Narrative:     Indicate which anticoagulants the patient is on:->UNKNOWN   ESTIMATED GFR - Abnormal; Notable for the following:     GFR If  51 (*)     GFR If Non  42 (*)     All other components within normal limits    Narrative:     Indicate which anticoagulants the patient is on:->UNKNOWN   PROTHROMBIN TIME    Narrative:     Indicate which anticoagulants the patient is on:->UNKNOWN   APTT    Narrative:     Indicate which anticoagulants the patient is on:->UNKNOWN   COD (ADULT)   TROPONIN    Narrative:     Indicate which anticoagulants the patient is on:->UNKNOWN   ABO CONFIRMATION   URINALYSIS,CULTURE IF INDICATED     All labs reviewed by me.    RADIOLOGY  CT-CTA HEAD WITH & W/O-POST PROCESS   Final Result         1.  Occlusion of the right middle cerebral artery at the distal M1.   2.  Occlusion of the right internal carotid artery extending from just distal to the bifurcation to the intracranial internal carotid arterial segments. Reconstitution of the right circulation is seen via the [place].   3.  Right P1 segment narrowing, compatible with stenosis or partial occlusion.   4.  Distal right vertebral artery stenosis with approximately 70% luminal narrowing.      These findings were discussed with the patient's clinician, Jimmy Lopes, on 8/24/2017 11:06 PM.      CT-HEAD W/O   Final Result         1.  Area of low-density in the right parieto-occipital lobe region,  compatible with areas of infarct. New since prior study   2.  Changes of small vessel ischemic disease with mild atrophy noted.   3.  Atherosclerosis.      DX-CHEST-PORTABLE (1 VIEW)    (Results Pending)   CT-CTA NECK WITH & W/O-POST PROCESSING    (Results Pending)     The radiologist's interpretation of all radiological studies have been reviewed by me.    COURSE & MEDICAL DECISION MAKING  Nursing notes, VS, PMSFHx reviewed in chart.     10:16 PM Patient seen and examined at bedside. Differential diagnosis includes but is not limited to hemorrhagic stroke, ischemic stroke, Ronald's paralysis, hemiplegic migraine, hyperglycemia. Ordered for immediate transport to CT for stroke protocol evaluation to evaluate.     10:30 PM while the patient was in CT, I spoke with the on-call telephone neurology physician from [facility], who feels that based on my initial assessment at triage, and the description the CT which shows a likely acute infarct that does correspond to the patient's symptoms, the patient is a reasonable TPA candidate.    10:36 PM I completed the patient's full NIH stroke scale, and she has a score of 8. I'm bringing her family back to the room to help with decision-making, either for or against TPA.    10:48 PM I spoken with the patient's son, and explained the risk benefit profile of TPA. He has a degree in biochemistry and is an informed decision maker, and feels that risk-benefit favors medication, in her case. He is aware of the potential for bleeding and death. A 2nd conversation with neurology, who has reviewed the images and laboratory tests and vital signs, and agrees that TPA is still indicated. I have called the pharmacy to initiate the TPA protocol.    10:49 PM Pharmacy will bring TPA.    11:17 PM TPA is infusing. I have spoken with the [facility] intensive care unit team, who will admit the patient for further evaluation and treatment. I received a phone call from radiology,   Mick, about 15 minutes ago, stating that the patient has a significant clot burden and that her CTA films will be reviewed with interventional radiology for consideration of clot retrieval. This has no bearing on whether or not she continues to TPA.    11:19 PM Dr. Campbell, IR reviewed her images, and feels that the patient's clot burden and underlying cerebral and cervical vascular disease maker her an poor candidate.     11:27 PM [facility]olph is at the bedside.     11:40 PM patient remains in stable but guarded condition. She is receiving her TPA infusion. She is not a clot retrieval candidate. She has been evaluated by the admitting team, and care is transferred at this time.    11:47 PM ICU nursing staff is here to transport the patient.    DISPOSITION:  Patient will be admitted to [facility] ICU team in guarded condition.      FINAL IMPRESSION  1. Ischemic stroke    Patient is critically ill.   The patient continues to have: stroke symptoms.   The vital organ system that is affected is the: brain  If untreated there is a high chance of deterioration into: worsening hemiplegia and cognitive deficits  And eventually death.   The critical care that I am providing today is: coordination of care with stroke protocol, pharmacy to deliver TPA, consults with radiology and interventional radiology regarding the patient's symptoms and imaging findings and plan of care, and discussing her course with the admitting hospitalist team  The critical that has been undertaken is medically complex.   There has been no overlap in critical care time.   Critical Care Time not including procedures: 45

## 2017-08-25 NOTE — PROGRESS NOTES
Patient to unit at 0010. Patient admitted to R101. Patient to ICU via slide board. Patient NIHSS of 10. Patient Alert and Oriented to self/place, can stated month, but note full date or year. Patient with baseline dementia per family. Patient with left facial droop, some slurred speech, baseline hard of hearing. Patient with right hearing aid. Left hearing aid left at home per family. Patient with motor drift in left upper and lower extremity. Patient NPO.  Skin assessment completed, multiple POA wounds noted, wound consult placed. Patient family at bedside, given stroke workbook, RN review workbook with family, all questions answered at this time. Patient oriented to unit. All needs met at this time. Hourly rounding in place.

## 2017-08-25 NOTE — PROGRESS NOTES
Unable to obtain blood sample for morning labs. Patient FSB, currently NPO. RN paged on-call PMA for updates. Awaiting call back.

## 2017-08-25 NOTE — PROGRESS NOTES
"[facility] ICU Progress Note      Admit Date: 8/24/2017  ICU Day: 1    Resident(s): Lul Plata  Attending: WILLY PUGH/ Dr. Correa    Date & Time:   8/25/2017   2:41 PM       Patient ID:    Name:             Juhi Zhao   YOB: 1923  Age:                 89 y.o.  female   MRN:               [id number]    HPI:  89 year old female with hx of dementia, DM II, HTN and  Hypothyroidism came with R side weakness, she was treated with TpA and then admitted to the ICU for follow up.     Consultants:  neurology  PMA:     Interval Events:      In ICU after TpA  CT head no bleefding  NPO   SLP, PT/OT  start with ASA and statin after 24 h from TpA  No surgery for carotid stenosis after discuss with family (only medication).  Palliative care consult.        Review of Systems   Constitutional: Negative for fever and chills.   HENT: Negative for hearing loss and tinnitus.    Respiratory: Negative for cough and hemoptysis.    Cardiovascular: Negative for chest pain, palpitations and orthopnea.   Gastrointestinal: Negative for heartburn and nausea.   Genitourinary: Negative for dysuria.   Musculoskeletal: Negative for neck pain.   Skin: Negative for rash.   Neurological: Negative for headaches.       PHYSICAL EXAM  Filed Vitals:    08/25/17 0540 08/25/17 0600 08/25/17 0610 08/25/17 0640   BP:       Pulse: 81  78 83   Temp:  36.8 °C (98.2 °F)     Resp: 25 25 24   Height:       Weight:       SpO2: 96%  97% 98%     Body mass index is 30.16 kg/(m^2).  /85 mmHg  Pulse 83  Temp(Src) 36.8 °C (98.2 °F)  Resp 24  Ht 1.67 m (5' 5.75\")  Wt 84.1 kg (185 lb 6.5 oz)  BMI 30.16 kg/m2  SpO2 98%  Breastfeeding? No  O2 therapy: Pulse Oximetry: 98 %, O2 (LPM): 4, O2 Delivery: Nasal Cannula    Physical Exam   Constitutional: She is well-developed, well-nourished, and in no distress. No distress.   Eyes: No scleral icterus.   Neck: No JVD present. No tracheal deviation present. No thyromegaly present. "   Cardiovascular: Normal rate.    No murmur heard.  Pulmonary/Chest: No respiratory distress. She has no wheezes. She has no rales.   Musculoskeletal: She exhibits edema.   Neurological: She is alert.   Aphasia   Weakness 1/5 on the R side     Skin: Skin is warm. No rash noted. No erythema.       Respiratory:     Respiration: (!) 24, Pulse Oximetry: 98 %    Chest Tube Drains:          Invalid input(s): [id number]    HemoDynamics:  Pulse: 83, Heart Rate (Monitored): 79 Blood Pressure : 135/85 mmHg, NIBP: 154/65 mmHg      Neuro:      Fluids:        Intake/Output Summary (Last 24 hours) at 17 1436  Last data filed at 17 0600   Gross per 24 hour   Intake    465 ml   Output      0 ml   Net    465 ml       Weight: 84.1 kg (185 lb 6.5 oz)  Body mass index is 30.16 kg/(m^2).    Recent Labs      17   0600   SODIUM  137  143   POTASSIUM  4.9  3.3*   CHLORIDE  103  117*   CO2  26  17*   BUN  41*  31*   CREATININE  1.20  0.69   MAGNESIUM   --   1.5   CALCIUM  10.1  6.6*       GI/Nutrition:  Recent Labs      17   0600   ALTSGPT  9   --    ASTSGOT  12   --    ALKPHOSPHAT  60   --    TBILIRUBIN  0.5   --    GLUCOSE  167*  131*       Heme:  Recent Labs      17   0600   RBC  4.60  3.24*   HEMOGLOBIN  13.9  9.8*   HEMATOCRIT  42.9  30.8*   PLATELETCT  220  155*   PROTHROMBTM  14.1   --    APTT  28.4   --    INR  1.06   --        Infectious Disease:  Temp  Av.8 °C (98.2 °F)  Min: 36.7 °C (98 °F)  Max: 36.8 °C (98.3 °F)  Recent Labs      17   0600   WBC  8.8  7.3   NEUTSPOLYS  58.30  69.50   LYMPHOCYTES  28.50  16.80*   MONOCYTES  9.50  10.50   EOSINOPHILS  2.70  1.80   BASOPHILS  0.50  0.60   ASTSGOT  12   --    ALTSGPT  9   --    ALKPHOSPHAT  60   --    TBILIRUBIN  0.5   --        Meds:  • potassium chloride SA  20 mEq     • aspirin EC  81 mg     • atorvastatin  40 mg     • 0.9 % NaCl with KCl 20 mEq 1,000 mL       • NS        • senna-docusate  2 Tab      And   • polyethylene glycol/lytes  1 Packet      And   • magnesium hydroxide  30 mL      And   • bisacodyl  10 mg     • insulin lispro  2-9 Units     • glucose 4 g  16 g      And   • dextrose 50%  25 mL     • labetalol  10 mg      Or   • hydrALAZINE  10 mg     • Nicardipine infusion  0-15 mg/hr Stopped (08/25/17 0000)        Procedures:  none    Imaging:  CT-HEAD W/O   Final Result      Right MCA territory infarct. Thrombosed right sylvian artery branches. No acute hemorrhage identified.   Mild right-sided mass effect with no midline shift.   Atrophy.   Central microvascular ischemic changes.      CT-CTA NECK WITH & W/O-POST PROCESSING   Final Result         1.  Occlusion of the right internal carotid artery just distal to the bifurcation.   2.  90% stenosis of the right internal carotid artery at the bifurcation.   3.  High-grade stenosis of the left proximal internal carotid artery resulting in up to 86%.   4.  Atherosclerosis and soft plaque at the origin and proximal portion of the left subclavian artery resulting in up to 70% stenosis.   5.  Distal right vertebral artery stenosis up to approximately 55%.   6.  Scattered emphysema      These findings were discussed with the patient's clinician, Jimmy Lopes, on 8/24/2017 11:06 PM.      DX-CHEST-PORTABLE (1 VIEW)   Final Result         1.  Hazy bilateral pulmonary opacities, greatest in the lung bases, appearance suggests infiltrates.   2.  Atherosclerosis      CT-CTA HEAD WITH & W/O-POST PROCESS   Final Result         1.  Occlusion of the right middle cerebral artery at the distal M1.   2.  Occlusion of the right internal carotid artery extending from just distal to the bifurcation to the intracranial internal carotid arterial segments. Reconstitution of the right circulation is seen via the [place].   3.  Right P1 segment narrowing, compatible with stenosis or partial occlusion.   4.  Distal right vertebral artery  stenosis with approximately 70% luminal narrowing.      These findings were discussed with the patient's clinician, Jimmy Lopes, on 8/24/2017 11:06 PM.      CT-HEAD W/O   Final Result         1.  Area of low-density in the right parieto-occipital lobe region, compatible with areas of infarct. New since prior study   2.  Changes of small vessel ischemic disease with mild atrophy noted.   3.  Atherosclerosis.      Echocardiogram Comp W/O cont    (Results Pending)   MR-BRAIN-W/O    (Results Pending)       Problem and Plan:      * Stroke (CMS-HCC) (present on admission)  Assessment & Plan  Acute ischemic embolic stroke causing left sided focal deficits.  CT-angio reveals occlusion of right MCA artery.  CTA of neck reveals high grade stenosis of both right and left internal carotid artery which is likely the source of the embolism.  Received tPA at 23 8/24/17 (NIH score was 10).  NPO repeat speech eval.  coretrak tube has to be considered if she does not pass swallow eval.   Aspirin and atorvastatin after 24 hours after tPA.   Keep BP less than 185/110 after TpA.   Echo: pending   PT/OT eval pending.  Discussed with family the vascular surgery vs meds and she is not a good candidate for surgery according to her age and dementia and co-morbidities.      Type 2 diabetes mellitus (CMS-HCC) (present on admission)  Assessment & Plan  Recently diagnosed last year.  A1c:7.7  Home meds metformin and glypizide.  Hold home meds and continue SSI and hypoglycemic protocol.     HTN (hypertension) (present on admission)  Assessment & Plan  Home medication Lisinopril 30mg daily.   BP: 140s/80s  Labetalol and hydralazine PRN and Nicardipine drip t to keep BP less than 185/110.  Resume home meds later.     Dementia (present on admission)  Assessment & Plan  FAST stage 5   She needs help for all ADLs  Could be vascular or alzheimer   PT/OT  Palliative care connsulted.     Hypothyroidism (present on admission)  Assessment &  Plan  Continue levothyroxine 125mcg  Check TSH.     Hypertrophic cardiomyopathy (CMS-HCC) (present on admission)  Assessment & Plan  No imaging on file.  Unclear history and etiology.  Echo pending.    Pedal edema (present on admission)  Assessment & Plan  Appears to be due to chronic venous stasis with redness on skin(no infection).  Home med:  lasix 80mg daily with KCL supplementation.  Resume home meds later.   Compression socks.         DISPO: ICU    CODE STATUS: DNR    Quality Measures:  Flor Catheter: no  DVT Prophylaxis: Tpa and then heparin   Ulcer Prophylaxis: none  Antibiotics: none  Lines: PIV

## 2017-08-25 NOTE — CARE PLAN
Problem: Emergency Care of the Stroke Patient  Goal: Establish Time of Onset  Intervention: Patient/Family verbalizes understanding of diagnosis, medications and testing  Patient diann Sumner at bedside, verbalized understanding of POC, all questions answered at this time.   Intervention: NIHSS Completed and Documented  (STROKE) NIH Stroke Scale Group   LOC:  Alert, Keenly Responsive  LOC Questions:  Answers Neither Question Correctly  LOC Commands:  Performs Both Tasks Correctly  Best Gaze:  Normal  Visual:  No visual loss.  Facial Palsy:  Partial Paralysis (Total or Near-Total Paralysis of Lower Face).  Rt Motor Arm:  No Drift. Limb Holds 90 (or 45) Degrees for Full 10 Seconds.  Lt Motor Arm:  Drift. Limb Holds 90 (or 45) Degrees, but Drifts Down Before Full 10 Seconds. Does Not Hit Bed or Other Support.  Rt Motor Leg:  No Drift. Leg Holds 30-Degree Position for Full 5 Seconds.  Lt Motor Leg:  Some Effort Against [place]. Leg Falls to Bed by 5 Seconds, but Has Some Effort Against Gravity.  Limb Ataxia:  Present in One Limb  Sensory:  Normal. No Sensory Loss  Best Language:  Mild to Mod. Aphasia. Some Obvious Loss of Fluency of Comprehension, w/o Signif. Limitation on Ideas Expressed. Reduction of Speech and/or Comprehension, However, Makes Conversation About Provided Materials Difficult or Impossible.  Dysarthria:  Mild to Moderate Dysarthria. Patient Slurs at Least Some Words and, at Worst, Can be Understood with Some Dificulty.  Extinction & Inattention:  No Abnormality  Total Score: 10        Goal: Medication as ordered  Outcome: PROGRESSING SLOWER THAN EXPECTED  Patient currently NPO strict, awaiting SLP for evaluation  Goal: Activity as appropriate  Outcome: PROGRESSING AS EXPECTED   Bedrest orders per MD.  Goal: Nutrition     Dysphagia Screening Tool  Identifying Dysphagia: Admitted with Neurological Event (Stroke, TIA, Seizure, Dementia, TBI, Spinal Cord Injury) (Awaiting SLP for fomal evaluation for s/p  tPA/[place]te[place])        Problem: Acute Care of the Stroke Patient  Goal: Optimal Outcome for the Stroke patient  Intervention: Vital Signs and Neuro Checks per order  Neuro checks and Vital sign checks in place.  Intervention: NIHSS completed and documented  NIHSS of 10 documented on admit to unit.  Intervention: Consider Echocardiogram  Order has been placed.  Intervention: Lipid panel if not completed in ED (if drawn previously, must be within 30 days to be current)  Order has been placed.  Intervention: Neuro Consult  Neuro MD consult in place.  Intervention: PT/OT/SLP needs: LIP must assess the need for Rehab Services. If none needed, LIP must document reason.  Orders in place. Patient and family updated on POC.      Problem: Knowledge Deficit  Goal: Patient/Significant other demonstrates understanding of disease process, treatment plan, medications and discharge instructions  Outcome: PROGRESSING AS EXPECTED  Patient family member at bedside, updated on POC. Patient diann Sumner given Stroke workbook. Stroke workbook at bedside.    Problem: Risk of Aspiration  Goal: Absence of aspiration  Outcome: PROGRESSING AS EXPECTED  Intervention: NPO until medically cleared  Patient strict NPO per MD order. HOB at 30 degrees or greater.

## 2017-08-25 NOTE — PROGRESS NOTES
RN spoke with Dr. Hyacinth Hidalgo for Neurology regarding patient Neuro status and the MRI.  Patient improving from tPA administration. Per MD ok to wait on MRI and not needed to be ordered as STAT at this time. New orders for CT.  Dayshilei RN updated.

## 2017-08-25 NOTE — PROGRESS NOTES
RN spoke with SHANTE Jansen regarding MRI.  Iris stated there was no available appointments last night and other ICU patients ahead of this order.  RN discussed with family and on-coming RN regarding what MRI tech stated. RN paged on-call Neurology for updates regarding MRI.  Awaiting call back.     RN updated Dr. oPp regarding Critical Ca level of 6.6. MD read back Critical Ca level of 6.6

## 2017-08-25 NOTE — ED NOTES
Pt has stronger / push pulls to left side. + drift to LEONOR and LLE. Pt denies headache. Right pupil remains slightly larger than left.

## 2017-08-25 NOTE — PROGRESS NOTES
Pulmonary Critical Care Progress Note        Date of Service:  8/25/2017    History of Present Illness: The patient is a 89-year-old fairly independent female with past medical history significant for mild dementia, systemic arterial hypertension, hypertrophic cardiomyopathy and thyroid disease, who had a sudden onset of slurred speech and left facial droop at approximately 1930 on the day of admission on 8/24.  She laid down and took an hour and a half nap and when she awoke, she had left arm and leg weakness which caused her to stumble and fall striking her head but without loss of consciousness.  She was brought in by EMS and was found to have an NIH score of 10.  A CT head without contrast revealed no acute intracranial process and a CT angio of the brain revealed occlusion of the right middle cerebral artery as well as the right internal carotid artery.  She remained within the thrombolytic window with no absolute contraindications and in consultation with tele-neurology, Dr. Mejía she was given TPA.      ROS:  Respiratory: negative, Cardiac: negative, GI: negative.  All other systems negative.    Interval Events:  24 hour interval history reviewed    - s/p alteplase at 2310   - Neuro checks ok with continued left sided facial/extremity weakness   - Failed swallow eval   - Skin breakdown to perineum as the patient is incontinent   - Going to CT head now    PFSH:  No change.    Respiratory:     Pulse Oximetry: 98 %    Exam: clear to auscultation without rales or wheezes  ImagingAvailable data reviewed         Invalid input(s): [id number]    HemoDynamics:  Pulse: 83, Heart Rate (Monitored): 79  Blood Pressure : 135/85 mmHg, NIBP: 154/65 mmHg       Exam: regular rate and rhythm  Imaging: Available data reviewed  Recent Labs      08/24/17   2140   TROPONINI  <0.01       Neuro:  GCS Total Randolph Coma Score: 14       Exam: slurred speech, very hard of hearing, moving left upper and lower extremity spontaneously,  but still weaker in comparison to the right, slight left facial droop  Imaging: Available data reviewed    Fluids:  Intake/Output       17 07 - 17 0659 (Not Admitted) 17 07 - 17 0659 17 07 - 17 0659      1424-0850 2422-7095 Total 1431-5364 8119-3358 Total 9121-8627 9192-4438 Total       Intake    P.O.  --  -- --  --  0 0  --  -- --    P.O. -- -- -- -- 0 0 -- -- --    I.V.  --  -- --  --  465 465  --  -- --    IV Volume (NS) -- -- -- -- 465 465 -- -- --    Total Intake -- -- -- -- 465 465 -- -- --       Output    Urine  --  -- --  --  -- --  --  -- --    Number of Times Incontinent of Urine -- -- -- -- 3 x 3 x -- -- --    Total Output -- -- -- -- -- -- -- -- --       Net I/O     -- -- -- -- 465 465 -- -- --        Weight: 84.1 kg (185 lb 6.5 oz)  Recent Labs      17   SODIUM  137  143   POTASSIUM  4.9  3.3*   CHLORIDE  103  117*   CO2  26  17*   BUN  41*  31*   CREATININE  1.20  0.69   MAGNESIUM   --   1.5   CALCIUM  10.1  6.6*       GI/Nutrition:  Exam: abdomen is soft and non-tender, normal active bowel sounds  Imaging: Available data reviewed  NPO  Liver Function  Recent Labs      17   06   ALTSGPT  9   --    ASTSGOT  12   --    ALKPHOSPHAT  60   --    TBILIRUBIN  0.5   --    GLUCOSE  167*  131*       Heme:  Recent Labs      17   RBC  4.60   HEMOGLOBIN  13.9   HEMATOCRIT  42.9   PLATELETCT  220   PROTHROMBTM  14.1   APTT  28.4   INR  1.06       Infectious Disease:  Temp  Av.8 °C (98.2 °F)  Min: 36.7 °C (98 °F)  Max: 36.8 °C (98.3 °F)  Micro: reviewed  Recent Labs      17   2140   WBC  8.8   NEUTSPOLYS  58.30   LYMPHOCYTES  28.50   MONOCYTES  9.50   EOSINOPHILS  2.70   BASOPHILS  0.50   ASTSGOT  12   ALTSGPT  9   ALKPHOSPHAT  60   TBILIRUBIN  0.5     Current Facility-Administered Medications   Medication Dose Frequency Provider Last Rate Last Dose   • NS infusion   Once PRN Jimmy Lopes,  M.D.       • senna-docusate (PERICOLACE or SENOKOT S) 8.6-50 MG per tablet 2 Tab  2 Tab BID Del Freeman M.D.        And   • polyethylene glycol/lytes (MIRALAX) PACKET 1 Packet  1 Packet QDAY PRN Del Freeman M.D.        And   • magnesium hydroxide (MILK OF MAGNESIA) suspension 30 mL  30 mL QDAY PRN Del Freeman M.D.        And   • bisacodyl (DULCOLAX) suppository 10 mg  10 mg QDAY PRN Del Freeman M.D.       • insulin lispro (HUMALOG) injection 2-9 Units  2-9 Units 4X/DAY ACHJONATHAN Freeman M.D.   2 Units at 08/25/17 0633   • glucose 4 g chewable tablet 16 g  16 g Q15 MIN PRN Del Freeman M.D.        And   • dextrose 50% (D50W) injection 25 mL  25 mL Q15 MIN PRN Del Freeman M.D.       • labetalol (NORMODYNE,TRANDATE) injection 10 mg  10 mg Q4HRS PRN Del Freeman M.D.        Or   • hydrALAZINE (APRESOLINE) injection 10 mg  10 mg Q2HRS PRN Del Freeman M.D.       • niCARdipine (CARDENE) 25 mg in  mL Infusion  0-15 mg/hr Continuous Del Freeman M.D.   Stopped at 08/25/17 0000   • NS infusion   Continuous Del Freeman M.D. 83 mL/hr at 08/25/17 0110       Last reviewed on 8/24/2017 10:10 PM by JOO GrahamD    Quality  Measures:  Medications reviewed, Labs reviewed and Radiology images reviewed        DVT Prophylaxis: Contraindicated - High bleeding risk  DVT prophylaxis - mechanical: SCDs  Ulcer prophylaxis: Not indicated        Problems/Plan:  Acute ischemic stroke with occlusion of the right MCA and right ICA    - s/p TPA and not candidate for interventional thrombectomy.   - ICU monitoring   - Q1 hr neuro checks   - will start heparin, asa, statin after 24 hours post alteplase  Systemic arterial hypertension.   - SBP goals 160-140   - labetalol/hydralazine as needed  Diabetes with hyperglycemia.   - ISS if needed  Acute kidney injury.   - improved with IVF  Mild noncardiogenic pulmonary edema, query neurogenic in nature.   - titrate O2 as needed   - monitor closely  Mild dementia at  baseline.  Hypothyroidism.  Do-not-resuscitate/do-not-intubate    Discussed patient condition and risk of morbidity and/or mortality with Family, RN, RT, Pharmacy, , [employer] resident and Patient.    The patient remains critically ill.  Critical care time = 35 minutes in directly providing and coordinating critical care and extensive data review.  No time overlap and excludes procedures.

## 2017-08-26 NOTE — CONSULTS
NEUROLOGY CONSULT         CHIEF COMPLAINT:  Left sided weakness.    Chief Complaint   Patient presents with   • Possible Stroke         Date of Service:  08/25/2017    REQUESTING PHYSICIAN: Angie Correa M.D.,       HISTORY OF PRESENT ILLNESS:  Juhi Zhao is a 89 y.o.female with history of DM, HTN, mild dementia who had a sudden onset of slurred speech and left sided facial droop that was witnessed at 19:30 by her family. She then took a  Nap, but when she woke up, she noted to be stumbling.  She was taken to [facility] where a stroke code was called. Her NIHSS was 10. CT of the Brain showed some hypodensities in the left pariietal lobe.  A teleneurology consult was placed, and  She received iv-TPA.   CTA of the head and neck showed a right ICA oclusion and right M1 occlusion.  Dr. Campbell, IR reviewed her images, and felt that given the clot burden and underlying cerebral and cervical vascular disease, she was a poor candidate for intervention.     ROS:  Positive review of systems are in BOLD  Constitutional: no fevers, chills, night sweats, weight loss  HEENT: no headache, blurred vision, loss of vision, double vision, congestion, sore throat, rhinorrhea, dyssphagia, edema   Chest: no SOB, cough, wheezing,  Heart: no chest pain, palpitation, orthopnea,  Abd: no abdominal pain, nausea, vomiting, diarrrhea, constipation,   Genitourinary: no hematuria, urinary frequency or urgency, bladder or bowel incontinence  Extremities: no joint pain, muscle pain, back pain, neck pain,   Hematological: no epistaxis, easy bruising, petechia  Neurological: please see history of present illness  Psychiatric: no anxiety, depression  Extremities: no swelling, joint pain,   Endocrine: no polyuria, polydipsea, heat or cold intolerance, weight gain, weight loss  Skin: no lesions, rashes.       PAST MEDICAL HISTORY:  Past Medical History   Diagnosis Date   • Arthritis    • Cancer (CMS-HCC)       "breast lt   • Unspecified urinary incontinence    • Hypertrophic cardiomyopathy (CMS-HCC)    • MR (mitral regurgitation)    • Unspecified disorder of thyroid    • UTI (lower urinary tract infection) 1/11/2016   • Anesthesia      \"Fear of not waking up\"   • Diabetes (CMS-HCC)    • Hypertension    • Urinary bladder disorder      hx of UTI    • Dental disorder      upper dentures   • Bronchitis    • Breath shortness      uses oxygen as needed         ALLERGIES:  Allergies   Allergen Reactions   • Penicillins Rash         MEDICATIONS:  No current facility-administered medications on file prior to encounter.     Current Outpatient Prescriptions on File Prior to Encounter   Medication Sig Dispense Refill   • metformin (GLUCOPHAGE) 500 MG Tab Take 500 mg by mouth 2 times a day, with meals.     • glipiZIDE (GLUCOTROL) 5 MG Tab Take 5 mg by mouth every day.     • levothyroxine (SYNTHROID) 125 MCG Tab Take 125 mcg by mouth Every morning on an empty stomach.     • lisinopril (PRINIVIL, ZESTRIL) 30 MG tablet Take 30 mg by mouth every day.         MEDICATIONS:    Current facility-administered medications:   •  potassium chloride SA (Kdur) tablet 20 mEq, 20 mEq, Oral, DAILY, Lul Plata M.D., Stopped at 08/25/17 0900  •  aspirin EC (ECOTRIN) tablet 81 mg, 81 mg, Oral, DAILY, Lul Plata M.D., Stopped at 08/25/17 1030  •  atorvastatin (LIPITOR) tablet 40 mg, 40 mg, Oral, QHS, Lul Plata M.D.  •  0.9 % NaCl with KCl 20 mEq infusion, , Intravenous, Continuous, Lul Plata M.D., Last Rate: 83 mL/hr at 08/25/17 1520  •  vitamin D (cholecalciferol) tablet 1,000 Units, 1,000 Units, Oral, DAILY, Lul Plata M.D., Stopped at 08/25/17 1645  •  NS infusion, , Intravenous, Once PRN, Jimmy Lopes M.D.  •  senna-docusate (PERICOLACE or SENOKOT S) 8.6-50 MG per tablet 2 Tab, 2 Tab, Oral, BID, Stopped at 08/25/17 0900 **AND** polyethylene glycol/lytes (MIRALAX) PACKET 1 Packet, 1 Packet, Oral, QDAY " PRN **AND** magnesium hydroxide (MILK OF MAGNESIA) suspension 30 mL, 30 mL, Oral, QDAY PRN **AND** bisacodyl (DULCOLAX) suppository 10 mg, 10 mg, Rectal, QDAY PRN, Del Freeman M.D.  •  insulin lispro (HUMALOG) injection 2-9 Units, 2-9 Units, Subcutaneous, 4X/DAY ACHS, Del Freeman M.D., Stopped at 08/25/17 1400  •  Action is required: Protocol 1073 Hypoglycemia has been implemented, , , Once **AND** Protocol 1073 Inclusion Criteria, , , CONTINUOUS **AND** Protocol 1073 NOTIFY, , , Once **AND** Protocol 1073 Initiate protocol immediately if FSBG is less than or equal to 70 mg/dL, , , CONTINUOUS **AND** glucose 4 g chewable tablet 16 g, 16 g, Oral, Q15 MIN PRN **AND** dextrose 50% (D50W) injection 25 mL, 25 mL, Intravenous, Q15 MIN PRN, Del Freeman M.D.  •  labetalol (NORMODYNE,TRANDATE) injection 10 mg, 10 mg, Intravenous, Q4HRS PRN **OR** hydrALAZINE (APRESOLINE) injection 10 mg, 10 mg, Intravenous, Q2HRS PRN, Del Freeman M.D.  •  niCARdipine (CARDENE) 25 mg in  mL Infusion, 0-15 mg/hr, Intravenous, Continuous, Del Freeman M.D., Stopped at 08/25/17 0000    FAMILY HISTORY:  Family History   Problem Relation Age of Onset   • Diabetes Mother    • Cancer Neg Hx        SOCIAL HISTORY:  Social History     Social History   • Marital Status:      Spouse Name: N/A   • Number of Children: N/A   • Years of Education: N/A     Occupational History   • Not on file.     Social History Main Topics   • Smoking status: Never Smoker    • Smokeless tobacco: Not on file   • Alcohol Use: No   • Drug Use: No   • Sexual Activity: Not on file     Other Topics Concern   • Not on file     Social History Narrative       EXAM:  Filed Vitals:    08/25/17 1710 08/25/17 1800 08/25/17 1810 08/25/17 1910   BP:       Pulse: 74  75 72   Temp:  36.8 °C (98.2 °F)     Resp: 25 25 26 23   Height:       Weight:       SpO2: 100%  93% 97%     General: pt is lying in bed, NAD  HEENT: normocephalic, atraumatic, normal moist oral mucosa  EYes:  anicteric, PERRLA, pupils midline  Neck: supple, no carotid bruits  Chest: CTA b/l no wheezing or rales  Heart: regular rate and rhythm, no murmurs  Abd: soft, non-tender, non distended  Extremtiies: no edema  Skin: no rashes or lesions  Neuro  General: pt was lethargic but arousable. She was hard of hearing, but there was no obvious aphasia.  She was slightly dysarhtric.  She was oriented to herself, age hospital, but not why she was hospitalized.    CN: visual fields are full to confrontation with no extinction to double simultaneous stimuli, EOMI she might have a slight right gaze preference, PERRLA, she had a left facial droop  Motor: She could slightly left her left arm against gravity but she cannot lift up her left foot. The right arm and right leg she could lift up against gravity  DTR: Absent throughout  Plantar reflexes: downgoing b/l   Coordination: finger to nose was intact on the right she cannot do it on the left  Sensation: Intact to task touch in upper extremities with no extinction to double simultaneous stimuli    LABORATORY TESTING  Lab Results   Component Value Date/Time    WBC 7.3 08/25/2017 06:00 AM    RBC 3.24* 08/25/2017 06:00 AM    HEMOGLOBIN 9.8* 08/25/2017 06:00 AM    HEMATOCRIT 30.8* 08/25/2017 06:00 AM    MCV 95.1 08/25/2017 06:00 AM    MCH 30.2 08/25/2017 06:00 AM    MCHC 31.8* 08/25/2017 06:00 AM    MPV 10.6 08/25/2017 06:00 AM    NEUTROPHILS-POLYS 69.50 08/25/2017 06:00 AM    LYMPHOCYTES 16.80* 08/25/2017 06:00 AM    MONOCYTES 10.50 08/25/2017 06:00 AM    EOSINOPHILS 1.80 08/25/2017 06:00 AM    BASOPHILS 0.60 08/25/2017 06:00 AM        IMAGING:  CT of the head was unremarkable showed  area of low-density in the right parieto-occipital lobe region, compatible with areas of infarct  CTA of the neck showed an right ICA occlusion just dsital from the birufcation to the intracranail carotid artery segment, and an occlusion of the right M1 segment.        TTE: .   Normal left ventricular  chamber size. Mild concentric left ventricular   hypertrophy. Hyperdynamic left ventricular systolic function. Left   ventricular ejection fraction is visually estimated to be greater than   75%. Normal regional wall motion. Diastolic function is indeterminate.  Mild mitral stenosis.  Right ventricular systolic pressure is estimated to be 50 mmHg.      IMPRESSION AND PLAN: Juhi Zhao is a 89 y.o.female with history of DM, HTN, mild dementia who was admitted with a right MCA stroke due to a right carotid artery occlusion who is status post TPA. The patient has made minimal improvement. She had a repeat CT of the brain today and then today which showed an evolving right MCA territory infarct but no no areas of hemorrhagic conversion    Recommend:  1) Continue Q1 hour checks in the ICU until 24 hours status post TPA.  After this point, she can be made Q4 hours.    2)  It  should be safe to start aspirin 325 mg daily 24 hours 24 hours after she received TPA.    3) The patient ahas not passed her swallow evaluation. I have not had a chance to speak to the family today regarding her CT findings and goals of care. If the patient is able to tolerate an MRI of the brain tomorrow we can consider it to further evaluate the size of stroke to help prognosticate her changes of recovery.  I will let her rest today as I was told she would not be able to cooperate with the MRI.     Hyacinth Hidalgo M.D.    Neurology

## 2017-08-26 NOTE — PROGRESS NOTES
Pulmonary Critical Care Progress Note        Date of Service:  8/26/2017    History of Present Illness: The patient is a 89-year-old fairly independent female with past medical history significant for mild dementia, systemic arterial hypertension, hypertrophic cardiomyopathy and thyroid disease, who had a sudden onset of slurred speech and left facial droop at approximately 1930 on the day of admission on 8/24.  She laid down and took an hour and a half nap and when she awoke, she had left arm and leg weakness which caused her to stumble and fall striking her head but without loss of consciousness.  She was brought in by EMS and was found to have an NIH score of 10.  A CT head without contrast revealed no acute intracranial process and a CT angio of the brain revealed occlusion of the right middle cerebral artery as well as the right internal carotid artery.  She remained within the thrombolytic window with no absolute contraindications and in consultation with tele-neurology, Dr. Mejía she was given TPA.      ROS:  Respiratory: negative, Cardiac: negative, GI: negative.  All other systems negative.    Interval Events:  24 hour interval history reviewed    - Worsening mental status since last night   - Very confused this morning   - Requiring more O2 since this morning   - Requiring frequent deep NT suctioning as she is not protecting airway or clearing secretions   - Flor placed this morning   - O2 at 10 lpm oxymask   - No pharmacy issues    Yesterday's Events:   - s/p alteplase at 2310   - Neuro checks ok with continued left sided facial/extremity weakness   - Failed swallow eval   - Skin breakdown to perineum as the patient is incontinent   - Going to CT head now    PFSH:  No change.    Respiratory:     Pulse Oximetry: 97 %on 10 lpm oxymask    Exam: coarse breath sounds throughout  ImagingAvailable data reviewed         Invalid input(s): [id number]    HemoDynamics:  Pulse: 86, Heart Rate (Monitored): 85   NIBP: 155/68       Exam: regular rate and rhythm  Imaging: Available data reviewed  Recent Labs      08/24/17 2140   TROPONINI  <0.01       Neuro:  GCS Total Rothsay Coma Score: 14       Exam: slurred speech, very hard of hearing, moving left upper and lower extremity spontaneously, but still weaker in comparison to the right, slight left facial droop  Imaging: Available data reviewed    Fluids:  Intake/Output       08/24/17 0700 - 08/25/17 0659 08/25/17 0700 - 08/26/17 0659 08/26/17 0700 - 08/27/17 0659      1220-4642 9868-9109 Total 1096-0481 1080-8755 Total 1628-3470 7056-0482 Total       Intake    P.O.  --  0 0  --  -- --  --  -- --    P.O. -- 0 0 -- -- -- -- -- --    I.V.  --  465 465  880  840 1720  --  -- --    Magnesium Sulfate Volume -- -- -- 50 -- 50 -- -- --    IV Volume (NS) -- 465 465  -- -- --    Total Intake -- 465 465  -- -- --       Output    Urine  --  -- --  --  0 0  --  -- --    Number of Times Incontinent of Urine -- 3 x 3 x 3 x 3 x 6 x -- -- --    Void (ml) -- -- -- -- 0 0 -- -- --    Total Output -- -- -- -- 0 0 -- -- --       Net I/O     -- 465 465  -- -- --           Recent Labs      08/24/17 2140 08/25/17   0600 08/26/17   0516   SODIUM  137  143  139   POTASSIUM  4.9  3.3*  4.6   CHLORIDE  103  117*  109   CO2  26  17*  23   BUN  41*  31*  23*   CREATININE  1.20  0.69  0.88   MAGNESIUM   --   1.5  2.3   PHOSPHORUS   --    --   2.7   CALCIUM  10.1  6.6*  8.9       GI/Nutrition:  Exam: abdomen is soft and non-tender, normal active bowel sounds  Imaging: Available data reviewed  NPO  Liver Function  Recent Labs      08/24/17 2140 08/25/17   0600  08/26/17   0516   ALTSGPT  9   --   6   ASTSGOT  12   --   15   ALKPHOSPHAT  60   --   58   TBILIRUBIN  0.5   --   0.7   GLUCOSE  167*  131*  155*       Heme:  Recent Labs      08/24/17 2140 08/25/17   0600   RBC  4.60  3.24*   HEMOGLOBIN  13.9  9.8*   HEMATOCRIT  42.9  30.8*   PLATELETCT  220  155*    PROTHROMBTM  14.1   --    APTT  28.4   --    INR  1.06   --        Infectious Disease:  Temp  Av.7 °C (98.1 °F)  Min: 36.6 °C (97.8 °F)  Max: 36.9 °C (98.5 °F)  Micro: reviewed  Recent Labs      17   2140  17   0600  17   0516   WBC  8.8  7.3   --    NEUTSPOLYS  58.30  69.50   --    LYMPHOCYTES  28.50  16.80*   --    MONOCYTES  9.50  10.50   --    EOSINOPHILS  2.70  1.80   --    BASOPHILS  0.50  0.60   --    ASTSGOT  12   --   15   ALTSGPT  9   --   6   ALKPHOSPHAT  60   --   58   TBILIRUBIN  0.5   --   0.7     Current Facility-Administered Medications   Medication Dose Frequency Provider Last Rate Last Dose   • potassium chloride SA (Kdur) tablet 20 mEq  20 mEq DAILY Lul Plata M.D.   Stopped at 17 0900   • aspirin EC (ECOTRIN) tablet 81 mg  81 mg DAILY Lul Plata M.D.   Stopped at 17 1030   • atorvastatin (LIPITOR) tablet 40 mg  40 mg QHS Lul Plata M.D.   Stopped at 17 2108   • vitamin D (cholecalciferol) tablet 1,000 Units  1,000 Units DAILY Lul Plata M.D.   Stopped at 17 1645   • NS infusion   Once PRN Jimmy Lopes M.D.       • senna-docusate (PERICOLACE or SENOKOT S) 8.6-50 MG per tablet 2 Tab  2 Tab BID Del Freeman M.D.   Stopped at 17 0900    And   • polyethylene glycol/lytes (MIRALAX) PACKET 1 Packet  1 Packet QDAY PRN Del Freeman M.D.        And   • magnesium hydroxide (MILK OF MAGNESIA) suspension 30 mL  30 mL QDAY PRN Del Freeman M.D.        And   • bisacodyl (DULCOLAX) suppository 10 mg  10 mg QDAY PRN Del Freeman M.D.       • insulin lispro (HUMALOG) injection 2-9 Units  2-9 Units 4X/DAY DEYANIRA Freeman M.D.   2 Units at 17 0628   • glucose 4 g chewable tablet 16 g  16 g Q15 MIN PRN Del Freeman M.D.        And   • dextrose 50% (D50W) injection 25 mL  25 mL Q15 MIN PRN Del Freeman M.D.       • labetalol (NORMODYNE,TRANDATE) injection 10 mg  10 mg Q4HRS PRN Del Freeman M.D.        Or   •  hydrALAZINE (APRESOLINE) injection 10 mg  10 mg Q2HRS PRN Del Freeman M.D.       • niCARdipine (CARDENE) 25 mg in  mL Infusion  0-15 mg/hr Continuous Del Freeman M.D.   Stopped at 08/25/17 0000     Last reviewed on 8/24/2017 10:10 PM by Amber Heaton, PharmD    Quality  Measures:  Radiology images reviewed, Labs reviewed and Medications reviewed  Flor catheter: No Flor      DVT Prophylaxis: Heparin  DVT prophylaxis - mechanical: SCDs  Ulcer prophylaxis: Not indicated        Problems/Plan:  Acute ischemic stroke with occlusion of the right MCA and right ICA    - s/p TPA and not candidate for interventional thrombectomy.   - worsening mental status and unable to protect airway or clear secretions   - pt wants to cont DNR/DNI status and will transition to comfort care status  Systemic arterial hypertension.   - SBP goals 160-140   - labetalol/hydralazine as needed  Diabetes with hyperglycemia.   - ISS if needed  Acute kidney injury.   - improved with IVF  Mild noncardiogenic pulmonary edema, query neurogenic in nature.   - titrate O2 as needed   - monitor closely  Mild dementia at baseline.  Hypothyroidism.  Do-not-resuscitate/do-not-intubate--> pt will transition to comfort care/hospice care    Discussed patient condition and risk of morbidity and/or mortality with Family, RN, RT, Pharmacy, , [facility] resident and Patient.    [id number]

## 2017-08-26 NOTE — PROGRESS NOTES
Patient continues to pick at IV and pull at lines and oxygen despite reorientation regarding the importance of keeping lines, equipment, and oxygen in place.  MD updated and restraint order received.  Patient and family updated.

## 2017-08-26 NOTE — CARE PLAN
Problem: Acute Care of the Stroke Patient  Goal: Optimal Outcome for the Stroke patient  Intervention: NIHSS completed and documented  Completed per protocol      Problem: Knowledge Deficit  Goal: Patient/Significant other demonstrates understanding of disease process, treatment plan, medications and discharge instructions  Intervention: Learning assessment and teaching  Education completed.  Stroke handout provided at bedside.  Patient and family educated and updated on plan of care.      Problem: Risk of Aspiration  Goal: Absence of aspiration  Intervention: Collaborate with SLP  SLP following.  SLP re-eval scheduled for the morning.  Patient and family updated.

## 2017-08-26 NOTE — THERAPY
"Speech Language Therapy dysphagia treatment completed.   Functional Status:  Pt awake and alert. Variable voicing with spont speech r/t variable upper airway secretions. Collaborated with nurs regarding request to see pt to assist in determining pt's POC r/t possible palliative care. Pt with weak and non-productive cough when cued. Pt tolerates oral stimulation with moist oral swab with oral moisturizer applied r/t dry oral cavity. Pt follows 3 simple lingual and labial oral motor directives. No responses to cues to swallow to clear secretions or in response to oral stimulation. Pt making eye contact with staff on the right side with probable left neglect noted. RT completed NT suction with approx 15 min of improved vocal quality, with less gurgliness and crackles auscultated to the upper airway, before voice became moderate to severe in wet sounding quality. SLP collaborated with frandy, MD, and pt's family member regarding NPO recommendations at this time with concerns for pt's upper airway protection.   Recommendations: NPO, determine pt/families preference for care.  Plan of Care: check pt's status.  Post-Acute Therapy: to be determined. Thanks, Eliseo    See \"Rehab Therapy-Acute\" Patient Summary Report for complete documentation.     "

## 2017-08-26 NOTE — PROGRESS NOTES
Updated Dr. Hidalgo (neurology) regarding patient's inability to stay still for MRI.  She stated that since she already had a CT scan today it's OK to cancel the MRI.  Patient's family at bedside.  Patient and family at bedside, updated on plan of care.

## 2017-08-26 NOTE — PALLIATIVE CARE
"PALLIATIVE CARE FOLLOW-UP:    Contacted by ICU RN d/t pt's clinical change - unresponsive, drop in saturation (currently 78% on NRB @ 15 lpm), elevated HR (117) and BP.  Grandson John at bedside - provided pt's living will declaration and DPOA designation of pt's sons Brennan and Bari.  Empathic support, discussed following pt's clear wishes as not any less sad but is a gift to know her wishes.  John attempting to contact his dad Brennan; Uncle Bari should arrive approx 15 minutes.      Discussed with Dr. Correa and GUERRERO Hancock.    Pt's son Bari arrived with his wife and three dtrs.  Bari well-versed in pt's EoL wishes and documentation of such.  Bari stated, \"She told me earlier that she's ready\" and \"She's been saying the same (wishes) since my dad  in .\"  Explained transitioning to comfort care which Bari agreed with -- also stating that he had spoken to his brother Brennan when pt was admitted and that the brothers agreed.    Discussed with granddtrs that they could hold their grandmother's hand and speak to her or whatever they are comfortable with, including not being present.  Family tearful.    Monitor turned off.  O2 off. ICU RN managing medications.    Plan:  Comfort Care    Thank you for allowing Palliative Care to support this pt and her family.  Contact [phone] for additional assistance, questions or concerns.  "

## 2017-08-26 NOTE — PROGRESS NOTES
Patient has been NT suctioned frequently, but is still has declining oxygen levels. Sputum is thin and sanguinous. Family is present with her advanced directive. Palliative care and physicians have been notified of changes, and family has agreed to proceed with comfort care.

## 2017-08-26 NOTE — PROGRESS NOTES
"[facility] ICU Progress Note      Admit Date: 8/24/2017  ICU Day: 2    Resident(s): Shar Montilla  Attending: WILLY PUGH/ Dr. Correa    Date & Time:   8/26/2017   8:10 AM       Patient ID:    Name:             Juhi Zhao   YOB: 1923  Age:                 89 y.o.  female   MRN:               [id number]    HPI:  89 year old female with hx of dementia, DM II, HTN and  Hypothyroidism came with R side weakness, she was treated with TpA and then admitted to the ICU for follow up.        Consultants:  Neurology  PMA-Dr Correa    Interval Events:  became tachypnea overnight, CXR ordered, sputum cultures, urine cultures sent,   Condition worsening and palliative will be called  Failed swallow eval  No fevers  No Leucocytosis  No acute change in mental status  S/p  TpA  CT head no bleefding-8/25  To feed via cortrak today  No surgery for carotid stenosis after discussion  with family (only medication).  Palliative care on board       ROS  Non verbal    PHYSICAL EXAM  Vitals:    08/26/17 0300 08/26/17 0400 08/26/17 0500 08/26/17 0600   BP:       Pulse: 86 77 88 86   Resp: (!) 27 (!) 25 (!) 31 (!) 26   Temp:  36.8 °C (98.2 °F)  36.6 °C (97.9 °F)   SpO2: 93% 94% 93% 97%   Weight:       Height:         Body mass index is 30.16 kg/m².  /85   Pulse 86   Temp 36.6 °C (97.9 °F)   Resp (!) 26   Ht 1.67 m (5' 5.75\")   Wt 84.1 kg (185 lb 6.5 oz)   SpO2 97%   Breastfeeding? No   BMI 30.16 kg/m²   O2 therapy: Pulse Oximetry: 97 %, O2 (LPM): 3, FIO2%: 2, O2 Delivery: Silicone Nasal Cannula    Physical Exam  General:Elderly female,  not in distress,   HEENT:no jaundice or scleral icterus, no pallor, no JVD  Respiratory:lungs clear to auscultation anteriorly and laterally    Cardiac:RRR,  S1/S2 present, no M/R/G  GI: abdomen non distended, soft, non tender, no organomegaly, bowel sounds normoactive  Neuro: alert but not oriented, non verbal(aphasic)  Pupils reactive to light  Msk/Extremities: radial, " dorsalis pedis pulses 2+b/l,     Respiratory:     Respiration: (!) 26, Pulse Oximetry: 97 %    Chest Tube Drains:          Invalid input(s): [id number]    HemoDynamics:  Pulse: 86, Heart Rate (Monitored): 85 NIBP: 155/68      Neuro:      Fluids:        Intake/Output Summary (Last 24 hours) at 17 0810  Last data filed at 17 0600   Gross per 24 hour   Intake             1554 ml   Output                0 ml   Net             1554 ml          Body mass index is 30.16 kg/m².    Recent Labs      17   05   SODIUM  137  143  139   POTASSIUM  4.9  3.3*  4.6   CHLORIDE  103  117*  109   CO2  26  17*  23   BUN  41*  31*  23*   CREATININE  1.20  0.69  0.88   MAGNESIUM   --   1.5  2.3   PHOSPHORUS   --    --   2.7   CALCIUM  10.1  6.6*  8.9       GI/Nutrition:  Recent Labs      17   0516   ALTSGPT  9   --   6   ASTSGOT  12   --   15   ALKPHOSPHAT  60   --   58   TBILIRUBIN  0.5   --   0.7   GLUCOSE  167*  131*  155*       Heme:  Recent Labs      17   0516   RBC  4.60  3.24*  4.27   HEMOGLOBIN  13.9  9.8*  13.0   HEMATOCRIT  42.9  30.8*  40.6   PLATELETCT  220  155*  212   PROTHROMBTM  14.1   --    --    APTT  28.4   --    --    INR  1.06   --    --        Infectious Disease:  Temp  Av.8 °C (98.2 °F)  Min: 36.6 °C (97.9 °F)  Max: 36.9 °C (98.5 °F)  Recent Labs      17   0516   WBC  8.8  7.3  8.4   NEUTSPOLYS  58.30  69.50  71.40   LYMPHOCYTES  28.50  16.80*  15.30*   MONOCYTES  9.50  10.50  9.70   EOSINOPHILS  2.70  1.80  2.40   BASOPHILS  0.50  0.60  0.60   ASTSGOT  12   --   15   ALTSGPT  9   --   6   ALKPHOSPHAT  60   --   58   TBILIRUBIN  0.5   --   0.7       Meds:  • potassium chloride SA  20 mEq     • aspirin EC  81 mg     • atorvastatin  40 mg     • vitamin D  1,000 Units     • NS       • senna-docusate  2 Tab      And   • polyethylene  glycol/lytes  1 Packet      And   • magnesium hydroxide  30 mL      And   • bisacodyl  10 mg     • insulin lispro  2-9 Units     • glucose 4 g  16 g      And   • dextrose 50%  25 mL     • labetalol  10 mg      Or   • hydrALAZINE  10 mg     • Nicardipine infusion  0-15 mg/hr Stopped (08/25/17 0000)        Procedures:  none    Imaging:  Echocardiogram Comp W/O cont   Final Result      CT-HEAD W/O   Final Result      Right MCA territory infarct. Thrombosed right sylvian artery branches. No acute hemorrhage identified.   Mild right-sided mass effect with no midline shift.   Atrophy.   Central microvascular ischemic changes.      CT-CTA NECK WITH & W/O-POST PROCESSING   Final Result         1.  Occlusion of the right internal carotid artery just distal to the bifurcation.   2.  90% stenosis of the right internal carotid artery at the bifurcation.   3.  High-grade stenosis of the left proximal internal carotid artery resulting in up to 86%.   4.  Atherosclerosis and soft plaque at the origin and proximal portion of the left subclavian artery resulting in up to 70% stenosis.   5.  Distal right vertebral artery stenosis up to approximately 55%.   6.  Scattered emphysema      These findings were discussed with the patient's clinician, Jimmy Lopes, on 8/24/2017 11:06 PM.      DX-CHEST-PORTABLE (1 VIEW)   Final Result         1.  Hazy bilateral pulmonary opacities, greatest in the lung bases, appearance suggests infiltrates.   2.  Atherosclerosis      CT-CTA HEAD WITH & W/O-POST PROCESS   Final Result         1.  Occlusion of the right middle cerebral artery at the distal M1.   2.  Occlusion of the right internal carotid artery extending from just distal to the bifurcation to the intracranial internal carotid arterial segments. Reconstitution of the right circulation is seen via the [place].   3.  Right P1 segment narrowing, compatible with stenosis or partial occlusion.   4.  Distal right vertebral artery  stenosis with approximately 70% luminal narrowing.      These findings were discussed with the patient's clinician, Jimmy Lopes, on 8/24/2017 11:06 PM.      CT-HEAD W/O   Final Result         1.  Area of low-density in the right parieto-occipital lobe region, compatible with areas of infarct. New since prior study   2.  Changes of small vessel ischemic disease with mild atrophy noted.   3.  Atherosclerosis.          Problem and Plan:      * Stroke (CMS-HCC)- (present on admission)   Assessment & Plan    Acute ischemic embolic stroke causing left sided focal deficits.  CT-angio reveals occlusion of right MCA artery.  CTA of neck reveals high grade stenosis of both right and left internal carotid artery which is likely the source of the embolism.  Received tPA at 23 8/24/17 (NIH score was 10).  Swallow eval failed today  Place cortrak for feeding  Continue Aspirin and atorvastatin   Keep BP less than 185/110   Echo: EF 75%, no  valvular lesions  PT/OT eval   Discussed with family the vascular surgery vs meds and she is not a good candidate for surgery according to her age and dementia and co-morbidities.          Dementia- (present on admission)   Assessment & Plan    FAST stage 5   She needs help for all ADLs  Could be vascular or alzheimer   PT/OT  Palliative care connsulted.         HTN (hypertension)- (present on admission)   Assessment & Plan    Home medication Lisinopril 30mg daily.   Resumed lisniopril 20mg   Monitor BP         Type 2 diabetes mellitus (CMS-HCC)- (present on admission)   Assessment & Plan    Recently diagnosed last year.  A1c:7.7  Home meds metformin and glypizide.  Hold home meds and continue SSI and hypoglycemic protocol.         Pedal edema- (present on admission)   Assessment & Plan    Appears to be due to chronic venous stasis with redness on skin(no infection).  Resumed lasix  Compression socks.         Hypertrophic cardiomyopathy (CMS-HCC)- (present on admission)   Assessment &  Plan    No imaging on file.  Unclear history and etiology.  Echo -EF 75%  Started lisinopril 20mg        Hypothyroidism- (present on admission)   Assessment & Plan    Continue levothyroxine 125mcg  TSH 0.2            DISPO: ICU    CODE STATUS: DNR    Quality Measures:  Flor Catheter: no  DVT Prophylaxis: Tpa and then heparin   Ulcer Prophylaxis: none  Antibiotics: none  Lines: PIV

## 2017-08-26 NOTE — PROGRESS NOTES
Late entry:    0400 patient noted to be unable to manage her secretions as well.  Multiple unsuccessful attempts at oral suctioning.  Audible crackles noted.  Dr. Freeman paged and updated on situation.  Orders received for stat chest x-ray, sputum culture, and to discontinue IV fluids.  Patient NT suctioned.  Moderate amount of yellow, tan sputum noted.  Sputum sample obtained and sent to lab for culture.  Orders entered in EPIC.

## 2017-08-26 NOTE — CARE PLAN
Problem: Safety  Goal: Free from accidental injury  Reorient pt frequently as needed..  Keep bed low with siderails up.  Activate bed alarm.  Instruct pt to call for assistance.  Have call light within reach    Problem: Knowledge Deficit  Goal: Patient/Significant other demonstrates understanding of disease process, treatment plan, medications and discharge instructions  Explain procedures and nursing care to pt and family.  Answer questions.    Problem: Skin Integrity  Goal: Skin Integrity is maintained or improved  Intervention: Implement Pressure Ulcer Prevention Protocol  Keep skin clean, dry and moisturized.  Turn and position q 2hr.  Monitor nutritional status.

## 2017-08-28 LAB
BACTERIA SPEC RESP CULT: NORMAL
GRAM STN SPEC: NORMAL
SIGNIFICANT IND 70042: NORMAL
SITE SITE: NORMAL
SOURCE SOURCE: NORMAL

## 2017-09-11 NOTE — DISCHARGE SUMMARY
CHIEF COMPLAINT ON ADMISSION  Chief Complaint   Patient presents with   • Possible Stroke       CODE STATUS  Prior    HPI & HOSPITAL COURSE  Ms. Zhao is a  89 year old female with PMHx significant for DM type 2, hypertension, breast cancer status post surgery and radiation, hypothyroidism, and chronic venous stasis presents to the ER with complaints of left sided weakness.     At 7:30PM the patient was noted to have some facial droop and dysarthria with no other symptoms. She took a nap for one and a half hours and after waking up she was walking to the bathroom with assistance from her grandson, she fell down. Initially family thought patient was having high blood sugar and EMS were called. They arrived and assessed her to have a stroke with left sided arm and leg weakness.      She was brought in to the ER and stroke protocol was started. CT angio was done and she was found to have significant clot burden on the right side. She was administered tPA appropriately after consultation with tele-neurology by  ER physician. It was administered at 10:49 PM which is within the window period since possible onset of stroke.      Of note, code status was discussed with both patient and her son. It was made clear that patient preferred to have quality of life and wished to be independent. Her previous code status was partial DNR and after discussion with patient and family, code status was changed to DNAR.    On day day 2 of admission patient's condition began to deteriorate.    She finally passed away on 8/26.        SPECIFIC OUTPATIENT FOLLOW-UP  none    DISCHARGE PROBLEM LIST  Principal Problem:    Stroke (CMS-HCC) POA: Yes  Active Problems:    Type 2 diabetes mellitus (CMS-HCC) POA: Yes    HTN (hypertension) POA: Yes    Dementia (Chronic) POA: Yes    Hypothyroidism POA: Yes    Hypertrophic cardiomyopathy (CMS-HCC) POA: Yes    Pedal edema POA: Yes  Resolved Problems:    * No resolved hospital problems. *      FOLLOW  UP  No future appointments.  Jovani Tracy M.D.  [address] [facility] [address]  [address]  [facility] [address]  [phone]            MEDICATIONS ON DISCHARGE   Juhi Zhao   Home Medication Instructions CAMRON:[id number]    Printed on:09/11/17 0842   Medication Information                      furosemide (LASIX) 40 MG Tab  Take 80 mg by mouth every day.             glipiZIDE (GLUCOTROL) 5 MG Tab  Take 5 mg by mouth every day.             levothyroxine (SYNTHROID) 125 MCG Tab  Take 125 mcg by mouth Every morning on an empty stomach.             lisinopril (PRINIVIL, ZESTRIL) 30 MG tablet  Take 30 mg by mouth every day.             metformin (GLUCOPHAGE) 500 MG Tab  Take 500 mg by mouth 2 times a day, with meals.             potassium chloride ER (KLOR-CON 10) 10 MEQ tablet  Take 10 mEq by mouth every day.                 DIET  No orders of the defined types were placed in this encounter.      ACTIVITY  none    CONSULTATIONS  none    PROCEDURES  none    LABORATORY  Lab Results   Component Value Date/Time    SODIUM 139 08/26/2017 05:16 AM    POTASSIUM 4.6 08/26/2017 05:16 AM    CHLORIDE 109 08/26/2017 05:16 AM    CO2 23 08/26/2017 05:16 AM    GLUCOSE 155 (H) 08/26/2017 05:16 AM    BUN 23 (H) 08/26/2017 05:16 AM    CREATININE 0.88 08/26/2017 05:16 AM        Lab Results   Component Value Date/Time    WBC 8.4 08/26/2017 05:16 AM    HEMOGLOBIN 13.0 08/26/2017 05:16 AM    HEMATOCRIT 40.6 08/26/2017 05:16 AM    PLATELETCT 212 08/26/2017 05:16 AM        Total time of the discharge process exceeds 45 minutes

## 2017-10-03 LAB — EKG IMPRESSION: NORMAL

## 2023-07-19 NOTE — ED NOTES
Family now at [facility]. Dr. Lopes to [facility] to speak with family. EKG in progress.    Goal Outcome Evaluation:   Pt is leaving at 3pm with hospice transportation according to